# Patient Record
Sex: MALE | Race: WHITE
[De-identification: names, ages, dates, MRNs, and addresses within clinical notes are randomized per-mention and may not be internally consistent; named-entity substitution may affect disease eponyms.]

---

## 2021-07-09 ENCOUNTER — HOSPITAL ENCOUNTER (EMERGENCY)
Dept: HOSPITAL 26 - MED | Age: 71
Discharge: HOME | End: 2021-07-09
Payer: COMMERCIAL

## 2021-07-09 VITALS — DIASTOLIC BLOOD PRESSURE: 64 MMHG | SYSTOLIC BLOOD PRESSURE: 139 MMHG

## 2021-07-09 VITALS — WEIGHT: 150 LBS | HEIGHT: 66 IN | BODY MASS INDEX: 24.11 KG/M2

## 2021-07-09 VITALS — DIASTOLIC BLOOD PRESSURE: 66 MMHG | SYSTOLIC BLOOD PRESSURE: 125 MMHG

## 2021-07-09 DIAGNOSIS — N40.0: ICD-10-CM

## 2021-07-09 DIAGNOSIS — N30.91: Primary | ICD-10-CM

## 2021-07-09 LAB
APPEARANCE UR: CLEAR
BILIRUB UR QL STRIP: (no result)
COLOR UR: YELLOW
GLUCOSE UR STRIP-MCNC: NEGATIVE MG/DL
HGB UR QL STRIP: (no result)
LEUKOCYTE ESTERASE UR QL STRIP: (no result)
NITRITE UR QL STRIP: NEGATIVE
PH UR STRIP: 6 [PH] (ref 5–9)
RBC #/AREA URNS HPF: (no result) /HPF (ref 0–5)
WBC,URINE: (no result) /HPF (ref 0–5)

## 2023-04-08 ENCOUNTER — HOSPITAL ENCOUNTER (EMERGENCY)
Dept: HOSPITAL 26 - MED | Age: 73
Discharge: HOME | End: 2023-04-08
Payer: COMMERCIAL

## 2023-04-08 VITALS — HEIGHT: 66 IN | WEIGHT: 128 LBS | BODY MASS INDEX: 20.57 KG/M2

## 2023-04-08 VITALS — DIASTOLIC BLOOD PRESSURE: 66 MMHG | SYSTOLIC BLOOD PRESSURE: 141 MMHG

## 2023-04-08 DIAGNOSIS — M54.50: ICD-10-CM

## 2023-04-08 DIAGNOSIS — Z79.899: ICD-10-CM

## 2023-04-08 DIAGNOSIS — Z90.49: ICD-10-CM

## 2023-04-08 DIAGNOSIS — N12: Primary | ICD-10-CM

## 2023-04-08 LAB
APPEARANCE UR: (no result)
BILIRUB UR QL STRIP: NEGATIVE
COLOR UR: YELLOW
GLUCOSE UR STRIP-MCNC: NEGATIVE MG/DL
HGB UR QL STRIP: (no result)
LEUKOCYTE ESTERASE UR QL STRIP: (no result)
NITRITE UR QL STRIP: NEGATIVE
PH UR STRIP: 5 [PH] (ref 5–9)
RBC #/AREA URNS HPF: (no result) /HPF (ref 0–5)
WBC,URINE: (no result) /HPF (ref 0–5)

## 2023-04-08 PROCEDURE — 96372 THER/PROPH/DIAG INJ SC/IM: CPT

## 2023-04-08 PROCEDURE — 99283 EMERGENCY DEPT VISIT LOW MDM: CPT

## 2023-04-08 PROCEDURE — 81001 URINALYSIS AUTO W/SCOPE: CPT

## 2023-04-08 NOTE — NUR
73/M WALKED IN C/O PELVIC PAIN, LOW BACK PAIN AND DYSURIA X 1 MONTH. DENIES 
HEMATURIA. AAO4, AFEBRILE, NO ACUTE DISTRESS NOTED.





NKA

PMH: HDL, BPH

## 2023-04-08 NOTE — NUR
Patient discharged with v/s stable. Written and verbal after care instructions 
ABOUT PYELONEPHRITIS AND ABD PAIN given and explained. 

Patient alert, oriented and verbalized understanding of instructions. 
Ambulatory with steady gait. All questions addressed prior to discharge. ID 
band removed. Patient advised to follow up with PMD. Rx of MOTRIN, CIPRO given. 
Patient educated on indication of medication including possible reaction and 
side effects. Opportunity to ask questions provided and answered.

## 2023-04-20 ENCOUNTER — HOSPITAL ENCOUNTER (EMERGENCY)
Dept: HOSPITAL 26 - MED | Age: 73
Discharge: HOME | End: 2023-04-20
Payer: COMMERCIAL

## 2023-04-20 VITALS — SYSTOLIC BLOOD PRESSURE: 119 MMHG | DIASTOLIC BLOOD PRESSURE: 72 MMHG

## 2023-04-20 VITALS — WEIGHT: 137 LBS | BODY MASS INDEX: 22.02 KG/M2 | HEIGHT: 66 IN

## 2023-04-20 VITALS — DIASTOLIC BLOOD PRESSURE: 74 MMHG | SYSTOLIC BLOOD PRESSURE: 122 MMHG

## 2023-04-20 DIAGNOSIS — N31.9: ICD-10-CM

## 2023-04-20 DIAGNOSIS — N13.4: ICD-10-CM

## 2023-04-20 DIAGNOSIS — R10.32: Primary | ICD-10-CM

## 2023-04-20 DIAGNOSIS — Z79.1: ICD-10-CM

## 2023-04-20 DIAGNOSIS — Z79.2: ICD-10-CM

## 2023-04-20 DIAGNOSIS — N40.0: ICD-10-CM

## 2023-04-20 DIAGNOSIS — R30.0: ICD-10-CM

## 2023-04-20 DIAGNOSIS — R35.0: ICD-10-CM

## 2023-04-20 LAB
ALBUMIN FLD-MCNC: 3.9 G/DL (ref 3.4–5)
ANION GAP SERPL CALCULATED.3IONS-SCNC: 13.8 MMOL/L (ref 8–16)
APPEARANCE UR: CLEAR
AST SERPL-CCNC: 30 U/L (ref 15–37)
BASOPHILS # BLD AUTO: 0 K/UL (ref 0–0.22)
BASOPHILS NFR BLD AUTO: 0.2 % (ref 0–2)
BILIRUB SERPL-MCNC: 0.6 MG/DL (ref 0–1)
BILIRUB UR QL STRIP: NEGATIVE
BUN SERPL-MCNC: 13 MG/DL (ref 7–18)
CHLORIDE SERPL-SCNC: 102 MMOL/L (ref 98–107)
CO2 SERPL-SCNC: 26 MMOL/L (ref 21–32)
COLOR UR: YELLOW
CREAT SERPL-MCNC: 1.2 MG/DL (ref 0.6–1.3)
EOSINOPHIL # BLD AUTO: 0.1 K/UL (ref 0–0.4)
EOSINOPHIL NFR BLD AUTO: 1.2 % (ref 0–4)
ERYTHROCYTE [DISTWIDTH] IN BLOOD BY AUTOMATED COUNT: 12.5 % (ref 11.6–13.7)
GFR SERPL CREATININE-BSD FRML MDRD: (no result) ML/MIN (ref 90–?)
GLUCOSE SERPL-MCNC: 94 MG/DL (ref 74–106)
GLUCOSE UR STRIP-MCNC: NEGATIVE MG/DL
HCT VFR BLD AUTO: 42.6 % (ref 36–52)
HGB BLD-MCNC: 14.5 G/DL (ref 12–18)
HGB UR QL STRIP: NEGATIVE
LEUKOCYTE ESTERASE UR QL STRIP: NEGATIVE
LYMPHOCYTES # BLD AUTO: 1.2 K/UL (ref 2–11.5)
LYMPHOCYTES NFR BLD AUTO: 14.6 % (ref 20.5–51.1)
MCH RBC QN AUTO: 31 PG (ref 27–31)
MCHC RBC AUTO-ENTMCNC: 34 G/DL (ref 33–37)
MCV RBC AUTO: 92.4 FL (ref 80–94)
MONOCYTES # BLD AUTO: 0.8 K/UL (ref 0.8–1)
MONOCYTES NFR BLD AUTO: 9.7 % (ref 1.7–9.3)
NEUTROPHILS # BLD AUTO: 6.1 K/UL (ref 1.8–7.7)
NEUTROPHILS NFR BLD AUTO: 74.3 % (ref 42.2–75.2)
NITRITE UR QL STRIP: POSITIVE
PH UR STRIP: 7 [PH] (ref 5–9)
PLATELET # BLD AUTO: 362 K/UL (ref 140–450)
POTASSIUM SERPL-SCNC: 3.8 MMOL/L (ref 3.5–5.1)
RBC # BLD AUTO: 4.62 MIL/UL (ref 4.2–6.1)
RBC #/AREA URNS HPF: (no result) /HPF (ref 0–5)
SODIUM SERPL-SCNC: 138 MMOL/L (ref 136–145)
WBC # BLD AUTO: 8.3 K/UL (ref 4.8–10.8)
WBC,URINE: (no result) /HPF (ref 0–5)

## 2023-04-20 PROCEDURE — 81003 URINALYSIS AUTO W/O SCOPE: CPT

## 2023-04-20 PROCEDURE — 85025 COMPLETE CBC W/AUTO DIFF WBC: CPT

## 2023-04-20 PROCEDURE — 96375 TX/PRO/DX INJ NEW DRUG ADDON: CPT

## 2023-04-20 PROCEDURE — 80053 COMPREHEN METABOLIC PANEL: CPT

## 2023-04-20 PROCEDURE — 81001 URINALYSIS AUTO W/SCOPE: CPT

## 2023-04-20 PROCEDURE — 87086 URINE CULTURE/COLONY COUNT: CPT

## 2023-04-20 PROCEDURE — 99285 EMERGENCY DEPT VISIT HI MDM: CPT

## 2023-04-20 PROCEDURE — 96374 THER/PROPH/DIAG INJ IV PUSH: CPT

## 2023-04-20 PROCEDURE — 74176 CT ABD & PELVIS W/O CONTRAST: CPT

## 2023-04-20 PROCEDURE — 36415 COLL VENOUS BLD VENIPUNCTURE: CPT

## 2023-04-20 PROCEDURE — 96361 HYDRATE IV INFUSION ADD-ON: CPT

## 2023-04-20 NOTE — NUR
sleeping, easily arousable, pain relieved with iv meds. voids per urinal. wears 
diaper due to dribbling

## 2023-05-15 ENCOUNTER — HOSPITAL ENCOUNTER (INPATIENT)
Dept: HOSPITAL 26 - MED | Age: 73
LOS: 3 days | Discharge: NURSING, REHAB OR LONG-TERM CARE | DRG: 374 | End: 2023-05-18
Attending: STUDENT IN AN ORGANIZED HEALTH CARE EDUCATION/TRAINING PROGRAM | Admitting: STUDENT IN AN ORGANIZED HEALTH CARE EDUCATION/TRAINING PROGRAM
Payer: COMMERCIAL

## 2023-05-15 VITALS — SYSTOLIC BLOOD PRESSURE: 141 MMHG | DIASTOLIC BLOOD PRESSURE: 83 MMHG

## 2023-05-15 VITALS — WEIGHT: 127 LBS | BODY MASS INDEX: 21.16 KG/M2 | HEIGHT: 65 IN

## 2023-05-15 DIAGNOSIS — Z79.1: ICD-10-CM

## 2023-05-15 DIAGNOSIS — R18.0: ICD-10-CM

## 2023-05-15 DIAGNOSIS — R59.1: ICD-10-CM

## 2023-05-15 DIAGNOSIS — N31.9: ICD-10-CM

## 2023-05-15 DIAGNOSIS — J44.9: ICD-10-CM

## 2023-05-15 DIAGNOSIS — E83.42: ICD-10-CM

## 2023-05-15 DIAGNOSIS — Z20.822: ICD-10-CM

## 2023-05-15 DIAGNOSIS — E78.00: ICD-10-CM

## 2023-05-15 DIAGNOSIS — C78.6: ICD-10-CM

## 2023-05-15 DIAGNOSIS — N40.0: ICD-10-CM

## 2023-05-15 DIAGNOSIS — F17.200: ICD-10-CM

## 2023-05-15 DIAGNOSIS — C18.9: Primary | ICD-10-CM

## 2023-05-15 DIAGNOSIS — E78.5: ICD-10-CM

## 2023-05-15 DIAGNOSIS — K56.600: ICD-10-CM

## 2023-05-15 DIAGNOSIS — I82.220: ICD-10-CM

## 2023-05-15 DIAGNOSIS — Z79.899: ICD-10-CM

## 2023-05-15 LAB
ALBUMIN FLD-MCNC: 3.4 G/DL (ref 3.4–5)
ANION GAP SERPL CALCULATED.3IONS-SCNC: 11.8 MMOL/L (ref 8–16)
APPEARANCE UR: CLEAR
AST SERPL-CCNC: 19 U/L (ref 15–37)
BASOPHILS # BLD AUTO: 0 K/UL (ref 0–0.22)
BASOPHILS NFR BLD AUTO: 0.2 % (ref 0–2)
BILIRUB SERPL-MCNC: 0.7 MG/DL (ref 0–1)
BILIRUB UR QL STRIP: NEGATIVE
BUN SERPL-MCNC: 12 MG/DL (ref 7–18)
CHLORIDE SERPL-SCNC: 105 MMOL/L (ref 98–107)
CO2 SERPL-SCNC: 25 MMOL/L (ref 21–32)
COLOR UR: YELLOW
CREAT SERPL-MCNC: 1.1 MG/DL (ref 0.6–1.3)
EOSINOPHIL # BLD AUTO: 0.1 K/UL (ref 0–0.4)
EOSINOPHIL NFR BLD AUTO: 0.8 % (ref 0–4)
ERYTHROCYTE [DISTWIDTH] IN BLOOD BY AUTOMATED COUNT: 13 % (ref 11.6–13.7)
GFR SERPL CREATININE-BSD FRML MDRD: (no result) ML/MIN (ref 90–?)
GLUCOSE SERPL-MCNC: 96 MG/DL (ref 74–106)
GLUCOSE UR STRIP-MCNC: NEGATIVE MG/DL
HCT VFR BLD AUTO: 41.6 % (ref 36–52)
HGB BLD-MCNC: 14 G/DL (ref 12–18)
HGB UR QL STRIP: NEGATIVE
LEUKOCYTE ESTERASE UR QL STRIP: NEGATIVE
LIPASE SERPL-CCNC: 61 U/L (ref 73–393)
LYMPHOCYTES # BLD AUTO: 0.8 K/UL (ref 2–11.5)
LYMPHOCYTES NFR BLD AUTO: 10.3 % (ref 20.5–51.1)
MCH RBC QN AUTO: 30 PG (ref 27–31)
MCHC RBC AUTO-ENTMCNC: 34 G/DL (ref 33–37)
MCV RBC AUTO: 90.5 FL (ref 80–94)
MONOCYTES # BLD AUTO: 0.6 K/UL (ref 0.8–1)
MONOCYTES NFR BLD AUTO: 8 % (ref 1.7–9.3)
NEUTROPHILS # BLD AUTO: 6.5 K/UL (ref 1.8–7.7)
NEUTROPHILS NFR BLD AUTO: 80.7 % (ref 42.2–75.2)
NITRITE UR QL STRIP: NEGATIVE
PH UR STRIP: 6 [PH] (ref 5–9)
PLATELET # BLD AUTO: 331 K/UL (ref 140–450)
POTASSIUM SERPL-SCNC: 3.8 MMOL/L (ref 3.5–5.1)
PROTHROMBIN TIME: 11 SECS (ref 10.8–13.4)
RBC # BLD AUTO: 4.59 MIL/UL (ref 4.2–6.1)
SODIUM SERPL-SCNC: 138 MMOL/L (ref 136–145)
WBC # BLD AUTO: 8.1 K/UL (ref 4.8–10.8)

## 2023-05-15 PROCEDURE — C9113 INJ PANTOPRAZOLE SODIUM, VIA: HCPCS

## 2023-05-15 NOTE — NUR
PT ADMITTED TO ICU.  PT AND WIFE AWARE.  ON BEDSIDE CARDIAC MONITOR.   PT IS 
A&OX4 RESP EVEN AND UNLABORED.

## 2023-05-15 NOTE — NUR
73 YR OLD MALE BIB SPOUSE C/O ABD PAIN [id number] RADIATES TO FLANK AREA .  10/10 
PAIN .  PT IS A&OX4 TAGALOG SPEAKING ONLY.  +NAUSEA DYURIA AND GEN WEAKNESS.  
ON BEDSIDE MONITOR .  HOB ELEVATED.  20G L AC BED AT LOWEST LEVEL SIDE RAILS UP 
X2.



NKDA

ENLARGED PROSTATE

## 2023-05-16 VITALS — SYSTOLIC BLOOD PRESSURE: 129 MMHG | DIASTOLIC BLOOD PRESSURE: 61 MMHG

## 2023-05-16 VITALS — DIASTOLIC BLOOD PRESSURE: 78 MMHG | SYSTOLIC BLOOD PRESSURE: 148 MMHG

## 2023-05-16 VITALS — DIASTOLIC BLOOD PRESSURE: 60 MMHG | SYSTOLIC BLOOD PRESSURE: 140 MMHG

## 2023-05-16 VITALS — DIASTOLIC BLOOD PRESSURE: 73 MMHG | SYSTOLIC BLOOD PRESSURE: 145 MMHG

## 2023-05-16 VITALS — DIASTOLIC BLOOD PRESSURE: 77 MMHG | SYSTOLIC BLOOD PRESSURE: 145 MMHG

## 2023-05-16 VITALS — SYSTOLIC BLOOD PRESSURE: 131 MMHG | DIASTOLIC BLOOD PRESSURE: 67 MMHG

## 2023-05-16 VITALS — DIASTOLIC BLOOD PRESSURE: 67 MMHG | SYSTOLIC BLOOD PRESSURE: 124 MMHG

## 2023-05-16 VITALS — SYSTOLIC BLOOD PRESSURE: 136 MMHG | DIASTOLIC BLOOD PRESSURE: 76 MMHG

## 2023-05-16 VITALS — SYSTOLIC BLOOD PRESSURE: 138 MMHG | DIASTOLIC BLOOD PRESSURE: 62 MMHG

## 2023-05-16 VITALS — DIASTOLIC BLOOD PRESSURE: 72 MMHG | SYSTOLIC BLOOD PRESSURE: 150 MMHG

## 2023-05-16 VITALS — DIASTOLIC BLOOD PRESSURE: 80 MMHG | SYSTOLIC BLOOD PRESSURE: 141 MMHG

## 2023-05-16 VITALS — DIASTOLIC BLOOD PRESSURE: 76 MMHG | SYSTOLIC BLOOD PRESSURE: 139 MMHG

## 2023-05-16 VITALS — DIASTOLIC BLOOD PRESSURE: 75 MMHG | SYSTOLIC BLOOD PRESSURE: 145 MMHG

## 2023-05-16 VITALS — DIASTOLIC BLOOD PRESSURE: 71 MMHG | SYSTOLIC BLOOD PRESSURE: 154 MMHG

## 2023-05-16 VITALS — DIASTOLIC BLOOD PRESSURE: 74 MMHG | SYSTOLIC BLOOD PRESSURE: 141 MMHG

## 2023-05-16 VITALS — DIASTOLIC BLOOD PRESSURE: 91 MMHG | SYSTOLIC BLOOD PRESSURE: 147 MMHG

## 2023-05-16 VITALS — SYSTOLIC BLOOD PRESSURE: 148 MMHG | DIASTOLIC BLOOD PRESSURE: 70 MMHG

## 2023-05-16 LAB
AMYLASE SERPL-CCNC: 53 U/L (ref 25–115)
ANION GAP SERPL CALCULATED.3IONS-SCNC: 12.5 MMOL/L (ref 8–16)
BARBITURATES UR QL SCN: NEGATIVE NG/ML
BASOPHILS # BLD AUTO: 0 K/UL (ref 0–0.22)
BASOPHILS NFR BLD AUTO: 0.1 % (ref 0–2)
BENZODIAZ UR QL SCN: NEGATIVE NG/ML
BUN SERPL-MCNC: 13 MG/DL (ref 7–18)
BZE UR QL SCN: NEGATIVE NG/ML
CANNABINOIDS UR QL SCN: NEGATIVE NG/ML
CHLORIDE SERPL-SCNC: 104 MMOL/L (ref 98–107)
CHOLEST/HDLC SERPL: 3.7 {RATIO} (ref 1–4.5)
CO2 SERPL-SCNC: 26.2 MMOL/L (ref 21–32)
CREAT SERPL-MCNC: 0.9 MG/DL (ref 0.6–1.3)
EOSINOPHIL # BLD AUTO: 0.1 K/UL (ref 0–0.4)
EOSINOPHIL NFR BLD AUTO: 1.3 % (ref 0–4)
ERYTHROCYTE [DISTWIDTH] IN BLOOD BY AUTOMATED COUNT: 13 % (ref 11.6–13.7)
GFR SERPL CREATININE-BSD FRML MDRD: (no result) ML/MIN (ref 90–?)
GLUCOSE SERPL-MCNC: 88 MG/DL (ref 74–106)
HCT VFR BLD AUTO: 38.7 % (ref 36–52)
HDLC SERPL-MCNC: 41 MG/DL (ref 40–60)
HGB BLD-MCNC: 13.2 G/DL (ref 12–18)
LDLC SERPL CALC-MCNC: 92 MG/DL (ref 60–100)
LIPASE SERPL-CCNC: 50 U/L (ref 73–393)
LYMPHOCYTES # BLD AUTO: 0.8 K/UL (ref 2–11.5)
LYMPHOCYTES NFR BLD AUTO: 11.3 % (ref 20.5–51.1)
MCH RBC QN AUTO: 31 PG (ref 27–31)
MCHC RBC AUTO-ENTMCNC: 34 G/DL (ref 33–37)
MCV RBC AUTO: 89.9 FL (ref 80–94)
MONOCYTES # BLD AUTO: 0.5 K/UL (ref 0.8–1)
MONOCYTES NFR BLD AUTO: 7.4 % (ref 1.7–9.3)
NEUTROPHILS # BLD AUTO: 5.9 K/UL (ref 1.8–7.7)
NEUTROPHILS NFR BLD AUTO: 79.9 % (ref 42.2–75.2)
OPIATES UR QL SCN: POSITIVE NG/ML
PCP UR QL SCN: NEGATIVE NG/ML
PLATELET # BLD AUTO: 325 K/UL (ref 140–450)
POTASSIUM SERPL-SCNC: 3.7 MMOL/L (ref 3.5–5.1)
PROTHROMBIN TIME: 11.8 SECS (ref 10.8–13.4)
PROTHROMBIN TIME: 12.1 SECS (ref 10.8–13.4)
RBC # BLD AUTO: 4.3 MIL/UL (ref 4.2–6.1)
SODIUM SERPL-SCNC: 139 MMOL/L (ref 136–145)
T4 FREE SERPL-MCNC: 1.27 NG/DL (ref 0.76–1.46)
TRIGL SERPL-MCNC: 96 MG/DL (ref 30–150)
TSH SERPL DL<=0.05 MIU/L-ACNC: 2.13 UIU/ML (ref 0.34–3.74)
WBC # BLD AUTO: 7.4 K/UL (ref 4.8–10.8)

## 2023-05-16 RX ADMIN — HEPARIN SODIUM SCH MLS/HR: 5000 INJECTION, SOLUTION INTRAVENOUS at 23:19

## 2023-05-16 RX ADMIN — MORPHINE SULFATE PRN MG: 2 INJECTION, SOLUTION INTRAMUSCULAR; INTRAVENOUS at 11:25

## 2023-05-16 RX ADMIN — HYDROCODONE BITARTRATE AND ACETAMINOPHEN PRN TAB: 7.5; 325 TABLET ORAL at 17:21

## 2023-05-16 RX ADMIN — HEPARIN SODIUM SCH MLS/HR: 5000 INJECTION, SOLUTION INTRAVENOUS at 01:04

## 2023-05-16 RX ADMIN — DOCUSATE SODIUM SCH MG: 100 CAPSULE, LIQUID FILLED ORAL at 20:06

## 2023-05-16 RX ADMIN — HEPARIN SODIUM SCH MLS/HR: 5000 INJECTION, SOLUTION INTRAVENOUS at 07:00

## 2023-05-16 RX ADMIN — SODIUM CHLORIDE SCH MLS/HR: 9 INJECTION, SOLUTION INTRAVENOUS at 12:32

## 2023-05-16 NOTE — NUR
RECEIVED PT IN BED ASLEEP EASILY AROUSABLE BY VERBAL STIMULI. DENIES PAIN AT THIS TIME. NO 
ACUTE RESPIRATORY DISTRESS NOTED. SKIN WARM AND DRY TO TOUCH. HEPARIN DRIP INFUSING WELL AS 
ORDERED. SAFETY PRECAUTIONS IN PLACE, CALL LIGHT IN REACH.

## 2023-05-16 NOTE — NUR
DC PLANNING

A 73 Y.O. [place] PATIENT PRESENTED TO ED FOR EVALUATION OF 1 MONTH HX OF 
INTERMITTENT,SQUEEZING/CRAMPING ABDOMINAL PAIN ASSOCIATED WITH EPIGASTRIC/ABDOMINAL 
PAIN,DECREASED APPETITE,DYSURIA AND GENERALIZED WEAKNESS.PATIENT ALSO COMPLAINING OF 
DIFFICULTY WALKING SECONDARY TO ABDOMINAL PAIN AND DECREASED URINARY OUTPUT. HX OF 
BPI,COPD,ALERT AND ORIENTED.ON ROOM AIR.ON HEPARIN DRIP.CM TO FOLLOW. 

-------------------------------------------------------------------------------

Addendum: 05/16/23 at 1523 by BALA HIGUERA CM

-------------------------------------------------------------------------------

DC PLANNING-LATE ENTRY

ABDOMEN PELVIS CT CONCERNING FOR LANIE METASTATIC DISEASE VS HYPOPROLIFERATIVE  DISEASE, 
MILD ASCITES CONCERNING FOR MALIGNANT ASCITES.PATIENT HAS NO PCP.DC PLAN - BACK  HOME WITH 
WIFE POSSIBLY WITH HOME HEALTH FOR PT.CM TO FOLLOW.   


-------------------------------------------------------------------------------

Addendum: 05/17/23 at 1240 by BALA HIGUERA CM

-------------------------------------------------------------------------------

DC PLANNING 

TRANSFERRED TO TELEMETRY UNIT 5/16/23.HEPARIN DRIP DISCONTINUED AND WAS STARTED ON ELIQUIS. 
HEMATOLOGY-ONCOLOGY ON BOARD FOR POSSIBLE STAGE 1V COLON CA.GI CONSULT REQUESTED FOR 
POSSIBLE COLONOSCOPY.NO ADDITIONAL CARDIAC WORKUP PER CARDIO.DC PLAN -TO [facility] FOR SNF 
PLACEMENT WHEN STABLE FOR DISCHARGE.WIFE AGREED WITH DC PLAN.CM TO FOLLOW.

-------------------------------------------------------------------------------

Addendum: 05/18/23 at 1638 by REX ORO CM

-------------------------------------------------------------------------------

RECEIVED ORDER FOR PATIENT TO GO TO SNF FOR PT. FAXED ALL PAPERWORK TO [facility]. SPOKE WITH KESHAV 
PATIENT WAS ACCEPTED AND WILL BE GOING TO ROOM 33A UNDER DR HOLLAND. TRANSPORTATION ARRANGED 
WITH THE HOUSE SUP WITH A [facility] TO TRANSPORT PATIENT TO [facility] WITH IN THE HOUR. NURSE TANYA AND 
WIFE AWARE OF THE ABOVE INFORMATION.

## 2023-05-16 NOTE — NUR
PTT RESULT @98.2. PER HEPARIN PROTOCOL, HOLD FOR ONE HOUR AND TITRATE DOWN 3 UNITS/KG/HR. 
WILL RESUME HEPARIN DRIP AT 0950 @15UNITS/KG/HR. PT VSS. NAD NOTED. WILL CONT TO MONITOR.

## 2023-05-16 NOTE — NUR
RECEIVED PT FROM ER. REPORT GIVEN BY AGUSTINA BRODERICK. PT ALERT AND ORIENTED. ABLE TO FOLLOW 
SIMPLE COMMANDS AND ABLE TO PROVIDE INFORMATION REGARDING HIS HEALTH CONDITION AND HISTORY. 
ON ROOM AIR WITH SATURATION OF 96%. LUNG SOUNDS CLEAR TO AUSCULTATION AT ALL LOBES. NO SOB. 
EYES REACTIVE TO LIGHT AND ACCOMMODATION. NORMAL SINUS RHYTHM ON THE MONITOR. SKIN INTACT. 
CONTINENT WITH CLEAR YELLOW URINE COLOR. GENERALIZED WEAKNESS ON UPPER AND LOWER EXTREMITIES 
BILATERALLY. REPOSITION AND PLACED COMFORTABLY IN BED WITH HEAD OF BED ELEVATED AT 30 
DEGREE. BED LOCK AND PLACED AT LOWEST POSITION. CALL LIGHT AND BELONGINGS WITHIN REACH. MAKE 
ALL NEEDS KNOWN. NO S/S OF DISTRESS. NO S/S OF PAIN. WIFE IS AT BEDSIDE DURING TRANSFER TO 
ICU. WILL CONTINUE TO MONITOR FOR CHANGE OF CONDITION

## 2023-05-16 NOTE — NUR
Patient will be admitted to care of DR ZARCO. Admited to ICU.  Will go to room 
ICU 8. Belongings list completed.  Report to AR RIBEIRO.

## 2023-05-16 NOTE — NUR
RECEIVED FROM ICU VIA WHEELCHAIR. ACCOMPANIED BY PT -WIFE. A & O X4. NO SOB, NOTED. NO C/O 
PAIN. IN STABLE CONDITION. SKIN WARM, DRY, AND INTACT. KEEP COMFORTABLE ON BED. FALL 
PRECAUTION APPLIED. CALL LIGHT WITHIN REACH.

## 2023-05-16 NOTE — NUR
CARE ENDORSED TO ERIC RIBEIRO FOR TELEMETRY UNIT ROOM 105B. VSS. NAD NOTED. HEPARIN DRIP RUNNING 
AT 800UNITS/HR. NO SIGNS OF BLEEDING. AAOX4. PT CONTINENT, USES URINAL. NO COMPLAINTS OF 
PAIN AT THIS TIME. ALL QUESTIONS ANSWERED. WIFE AT BEDSIDE. END OF CARE.

## 2023-05-16 NOTE — NUR
DC PLANNING 



*** ASSESSMENT COMPLETE***



PLEASE REFER TO ASSESSMENT FOR ADDITIONAL DETAILS



PT REPORTS BEING TOLD ABOUT POSSIBLE CANCER DX AND REPORTS BEING WORRIED. PT STRUGGLED TO 
IDENTIFY DC PLAN, HOWEVER CURRENT DC PLAN TENTATIVE ON PHYSICIAN RECOMMENDATIONS. 

-------------------------------------------------------------------------------

Addendum: 05/16/23 at 1223 by Staci Marsh SS

-------------------------------------------------------------------------------

Amended: Links added.

## 2023-05-16 NOTE — NUR
PATIENT HAS BEEN SCREENED AND CATEGORIZED AS MODERATE NUTRITION RISK. PATIENT WILL BE SEEN 
WITHIN 3-5 DAYS OF ADMISSION.



5/18/235/20/23



REVIEWED BY CASSIDY HARRISON RD

## 2023-05-17 VITALS — SYSTOLIC BLOOD PRESSURE: 147 MMHG | DIASTOLIC BLOOD PRESSURE: 61 MMHG

## 2023-05-17 VITALS — DIASTOLIC BLOOD PRESSURE: 44 MMHG | SYSTOLIC BLOOD PRESSURE: 110 MMHG

## 2023-05-17 VITALS — SYSTOLIC BLOOD PRESSURE: 140 MMHG | DIASTOLIC BLOOD PRESSURE: 62 MMHG

## 2023-05-17 VITALS — DIASTOLIC BLOOD PRESSURE: 76 MMHG | SYSTOLIC BLOOD PRESSURE: 142 MMHG

## 2023-05-17 VITALS — SYSTOLIC BLOOD PRESSURE: 137 MMHG | DIASTOLIC BLOOD PRESSURE: 57 MMHG

## 2023-05-17 VITALS — DIASTOLIC BLOOD PRESSURE: 69 MMHG | SYSTOLIC BLOOD PRESSURE: 148 MMHG

## 2023-05-17 LAB
ANION GAP SERPL CALCULATED.3IONS-SCNC: 12.7 MMOL/L (ref 8–16)
BASOPHILS # BLD AUTO: 0 K/UL (ref 0–0.22)
BASOPHILS NFR BLD AUTO: 0.2 % (ref 0–2)
BUN SERPL-MCNC: 11 MG/DL (ref 7–18)
CHLORIDE SERPL-SCNC: 104 MMOL/L (ref 98–107)
CO2 SERPL-SCNC: 26.1 MMOL/L (ref 21–32)
CREAT SERPL-MCNC: 1 MG/DL (ref 0.6–1.3)
EOSINOPHIL # BLD AUTO: 0.1 K/UL (ref 0–0.4)
EOSINOPHIL NFR BLD AUTO: 1.7 % (ref 0–4)
ERYTHROCYTE [DISTWIDTH] IN BLOOD BY AUTOMATED COUNT: 12.9 % (ref 11.6–13.7)
GFR SERPL CREATININE-BSD FRML MDRD: (no result) ML/MIN (ref 90–?)
GLUCOSE SERPL-MCNC: 85 MG/DL (ref 74–106)
HCT VFR BLD AUTO: 37.7 % (ref 36–52)
HGB BLD-MCNC: 13 G/DL (ref 12–18)
LYMPHOCYTES # BLD AUTO: 0.9 K/UL (ref 2–11.5)
LYMPHOCYTES NFR BLD AUTO: 13.6 % (ref 20.5–51.1)
MAGNESIUM SERPL-MCNC: 1.9 MG/DL (ref 1.8–2.4)
MCH RBC QN AUTO: 31 PG (ref 27–31)
MCHC RBC AUTO-ENTMCNC: 34 G/DL (ref 33–37)
MCV RBC AUTO: 89.7 FL (ref 80–94)
MONOCYTES # BLD AUTO: 0.7 K/UL (ref 0.8–1)
MONOCYTES NFR BLD AUTO: 10.4 % (ref 1.7–9.3)
NEUTROPHILS # BLD AUTO: 4.9 K/UL (ref 1.8–7.7)
NEUTROPHILS NFR BLD AUTO: 74.1 % (ref 42.2–75.2)
PHOSPHATE SERPL-MCNC: 2.9 MG/DL (ref 2.5–4.9)
PLATELET # BLD AUTO: 325 K/UL (ref 140–450)
POTASSIUM SERPL-SCNC: 3.8 MMOL/L (ref 3.5–5.1)
RBC # BLD AUTO: 4.21 MIL/UL (ref 4.2–6.1)
SODIUM SERPL-SCNC: 139 MMOL/L (ref 136–145)
WBC # BLD AUTO: 6.6 K/UL (ref 4.8–10.8)

## 2023-05-17 RX ADMIN — HYDROCODONE BITARTRATE AND ACETAMINOPHEN PRN TAB: 7.5; 325 TABLET ORAL at 06:11

## 2023-05-17 RX ADMIN — SODIUM CHLORIDE SCH MLS/HR: 9 INJECTION, SOLUTION INTRAVENOUS at 09:07

## 2023-05-17 RX ADMIN — DOCUSATE SODIUM SCH MG: 100 CAPSULE, LIQUID FILLED ORAL at 09:07

## 2023-05-17 RX ADMIN — DOCUSATE SODIUM SCH MG: 100 CAPSULE, LIQUID FILLED ORAL at 20:10

## 2023-05-17 RX ADMIN — APIXABAN SCH MG: 2.5 TABLET, FILM COATED ORAL at 20:11

## 2023-05-17 RX ADMIN — MORPHINE SULFATE PRN MG: 2 INJECTION, SOLUTION INTRAMUSCULAR; INTRAVENOUS at 02:47

## 2023-05-17 RX ADMIN — HEPARIN SODIUM SCH MLS/HR: 5000 INJECTION, SOLUTION INTRAVENOUS at 02:51

## 2023-05-17 RX ADMIN — POLYETHYLENE GLYCOL 3350 SCH GM: 17 POWDER, FOR SOLUTION ORAL at 20:10

## 2023-05-17 RX ADMIN — PANTOPRAZOLE SODIUM SCH MG: 40 INJECTION, POWDER, FOR SOLUTION INTRAVENOUS at 09:07

## 2023-05-17 RX ADMIN — LACTULOSE SCH GM: 10 SOLUTION ORAL at 20:08

## 2023-05-17 NOTE — NUR
RECEIVED PATIENT FROM PM SHIFT NURSE FOR CONTINUATION OF CARE. JAI SEEN ASLEEP. NORAML 
RISE AND FALL OF CHEST

## 2023-05-17 NOTE — NUR
PATIENT SEEN BY MD WITH RN. PATIENT STATES HE WANTS TO HAVE MORE TESTS DONE TO PINPOINT THE 
SPECIFIC MEDICAL PROBLEM IN ORDER FOR PROPER TREATMENT AND HEALING. PATIENT DENIES COMFORT 
CARE IN FACILITY DUE TO HIM AND HIS WIFE BEING OF OLD AGE WITH NO AVAILABLE ASSISTANCE. 
PATIENT ALSO REFUSES TO GO TO A CARE FACILITY. PATIENT WANTS TO BE TREATED IN THE HOSPITAL.

## 2023-05-17 NOTE — NUR
PATIENT DECIDES NOT TO TAKE MORPHINE AND WANTS SOMETHING ORAL. MD NOTIFIED. MD GAVE ORDERS 
FOR DILAUDED PO FOR SEVERE PAIN.

## 2023-05-17 NOTE — NUR
VITAL SIGNS TAKEN AND DOCUMENTED, WITHIN NORMAL LIMITS. DENIES PAIN. CALL LIGHT REMAINS 
WITHIN REACH.

## 2023-05-17 NOTE — NUR
PT IS ASLEEP. ALL NEEDS ATTENDED TO. NO S/SX OF PAIN NOR DISCOMFORT. SAFETY PRECAUTIONS IN 
PLACE, CALL LIGHT REMAINS WITHIN REACH.

## 2023-05-17 NOTE — NUR
RECEIVED PT IN BED AWAKE, ALERT AND ORIENTED X 4. DENIES PAIN AT THIS TIME. NO  ACUTE 
RESPIRATORY DISTRESS NOTED. SKIN WARM AND DRY TO TOUCH. IVF INFUSING WELL AS ORDERED. SAFETY 
PRECAUTIONS IN PLACE, CALL LIGHT IN REACH ENCOURAGED TO CALL IF ASSISTANCE IS NEEDED, PT 
VERBALLY ACKNOWLEDGED.

## 2023-05-18 VITALS — DIASTOLIC BLOOD PRESSURE: 67 MMHG | SYSTOLIC BLOOD PRESSURE: 154 MMHG

## 2023-05-18 VITALS — DIASTOLIC BLOOD PRESSURE: 68 MMHG | SYSTOLIC BLOOD PRESSURE: 107 MMHG

## 2023-05-18 VITALS — SYSTOLIC BLOOD PRESSURE: 107 MMHG | DIASTOLIC BLOOD PRESSURE: 68 MMHG

## 2023-05-18 VITALS — SYSTOLIC BLOOD PRESSURE: 140 MMHG | DIASTOLIC BLOOD PRESSURE: 68 MMHG

## 2023-05-18 VITALS — SYSTOLIC BLOOD PRESSURE: 142 MMHG | DIASTOLIC BLOOD PRESSURE: 68 MMHG

## 2023-05-18 LAB
ANION GAP SERPL CALCULATED.3IONS-SCNC: 9.6 MMOL/L (ref 8–16)
BASOPHILS # BLD AUTO: 0 K/UL (ref 0–0.22)
BASOPHILS NFR BLD AUTO: 0.1 % (ref 0–2)
BUN SERPL-MCNC: 9 MG/DL (ref 7–18)
CHLORIDE SERPL-SCNC: 104 MMOL/L (ref 98–107)
CO2 SERPL-SCNC: 28 MMOL/L (ref 21–32)
CREAT SERPL-MCNC: 0.9 MG/DL (ref 0.6–1.3)
EOSINOPHIL # BLD AUTO: 0.1 K/UL (ref 0–0.4)
EOSINOPHIL NFR BLD AUTO: 2 % (ref 0–4)
ERYTHROCYTE [DISTWIDTH] IN BLOOD BY AUTOMATED COUNT: 12.7 % (ref 11.6–13.7)
GFR SERPL CREATININE-BSD FRML MDRD: (no result) ML/MIN (ref 90–?)
GLUCOSE SERPL-MCNC: 97 MG/DL (ref 74–106)
HCT VFR BLD AUTO: 35.3 % (ref 36–52)
HGB BLD-MCNC: 12.1 G/DL (ref 12–18)
LYMPHOCYTES # BLD AUTO: 0.8 K/UL (ref 2–11.5)
LYMPHOCYTES NFR BLD AUTO: 11.6 % (ref 20.5–51.1)
MAGNESIUM SERPL-MCNC: 1.7 MG/DL (ref 1.8–2.4)
MCH RBC QN AUTO: 31 PG (ref 27–31)
MCHC RBC AUTO-ENTMCNC: 34 G/DL (ref 33–37)
MCV RBC AUTO: 89.7 FL (ref 80–94)
MONOCYTES # BLD AUTO: 0.8 K/UL (ref 0.8–1)
MONOCYTES NFR BLD AUTO: 11.2 % (ref 1.7–9.3)
NEUTROPHILS # BLD AUTO: 5.3 K/UL (ref 1.8–7.7)
NEUTROPHILS NFR BLD AUTO: 75.1 % (ref 42.2–75.2)
PHOSPHATE SERPL-MCNC: 2.7 MG/DL (ref 2.5–4.9)
PLATELET # BLD AUTO: 288 K/UL (ref 140–450)
POTASSIUM SERPL-SCNC: 3.6 MMOL/L (ref 3.5–5.1)
RBC # BLD AUTO: 3.94 MIL/UL (ref 4.2–6.1)
SODIUM SERPL-SCNC: 138 MMOL/L (ref 136–145)
WBC # BLD AUTO: 7.1 K/UL (ref 4.8–10.8)

## 2023-05-18 PROCEDURE — 0DJD8ZZ INSPECTION OF LOWER INTESTINAL TRACT, VIA NATURAL OR ARTIFICIAL OPENING ENDOSCOPIC: ICD-10-PCS

## 2023-05-18 RX ADMIN — PANTOPRAZOLE SODIUM SCH MG: 40 INJECTION, POWDER, FOR SOLUTION INTRAVENOUS at 08:36

## 2023-05-18 RX ADMIN — LACTULOSE SCH GM: 10 SOLUTION ORAL at 08:36

## 2023-05-18 RX ADMIN — SODIUM CHLORIDE SCH MLS/HR: 9 INJECTION, SOLUTION INTRAVENOUS at 03:38

## 2023-05-18 RX ADMIN — POLYETHYLENE GLYCOL 3350 SCH GM: 17 POWDER, FOR SOLUTION ORAL at 08:37

## 2023-05-18 RX ADMIN — SODIUM SULFATE, POTASSIUM SULFATE, MAGNESIUM SULFATE SCH ML: 17.5; 3.13; 1.6 SOLUTION, CONCENTRATE ORAL at 09:00

## 2023-05-18 RX ADMIN — POLYETHYLENE GLYCOL 3350 SCH GM: 17 POWDER, FOR SOLUTION ORAL at 13:00

## 2023-05-18 RX ADMIN — SODIUM SULFATE, POTASSIUM SULFATE, MAGNESIUM SULFATE SCH ML: 17.5; 3.13; 1.6 SOLUTION, CONCENTRATE ORAL at 05:37

## 2023-05-18 RX ADMIN — APIXABAN SCH MG: 2.5 TABLET, FILM COATED ORAL at 09:00

## 2023-05-18 RX ADMIN — DOCUSATE SODIUM SCH MG: 100 CAPSULE, LIQUID FILLED ORAL at 08:37

## 2023-05-18 RX ADMIN — LACTULOSE SCH GM: 10 SOLUTION ORAL at 13:00

## 2023-05-18 RX ADMIN — SENNOSIDES A AND B SCH MG: 8.6 TABLET, FILM COATED ORAL at 13:00

## 2023-05-18 RX ADMIN — SENNOSIDES A AND B SCH MG: 8.6 TABLET, FILM COATED ORAL at 08:37

## 2023-05-18 NOTE — NUR
patient discharged to [facility] via [facility] accompanied by wife. report given to Heena RIBEIRO at [facility]. 
patient stable upon discharge

## 2023-05-18 NOTE — NUR
received patient resting in bed, not in any form of distress, patient on bowel prep for 
colonoscopy, clear watery stools noted, Dr. Rob came to see patient. will continue to 
monitor.

## 2023-05-18 NOTE — NUR
CALL PLACED TO DR. LEAHY TO INFORM MD THAT PT HAD ONLY 1 BM AND NOT CLEAR, HAD A LOT OF 
FORMED STOOLS, PER MD JUST KEEP GIVING ALL LAXATIVES.

## 2023-05-18 NOTE — NUR
5/18/23 RD INITIAL ASSESSMENT COMPLETED



PLEASE REFER TO NUTRITION ASSESSMENT UNDER CARE ACTIVITY FOR ESTIMATED NUTRITIONAL NEEDS. 



1. MONITOR NPO STATUS 

2. RECOMMEND CARDIAC DIET WHEN/IF MEDICALLY APPROPRIATE

3. RD TO FOLLOW-UP 7 DAYS, LOW RISK 



REVIEWED BY CASSIDY HARRISON RD

## 2023-05-18 NOTE — NUR
ASSISTED PT TO THE BATHROOM AND BACK IN BED. PATIENT HAD A LARGE WATERY WITH FORMED STOOL. 
UPON CLEANING BY PT, NOTED SOME BLOOD STAIN ON THE TISSUE, PER PT HE HAS HEMORRHOIDS. MADE 
COMFORTABLE IN BED, PLACE CALL LIGHT WITHIN REACH, INSTRUCTED TO CALL IF ASSISTANCE IS 
NEEDED, PT VERBALLY ACKNOWLEDGED.

## 2023-05-18 NOTE — NUR
PATIENT HAD A LARGE WATERY BROWN WITH SOME SOLID STOOL. SUPREP GIVEN AS ORDERED. AM CARE 
DONE BY PT.

## 2023-05-22 ENCOUNTER — HOSPITAL ENCOUNTER (INPATIENT)
Dept: HOSPITAL 26 - MED | Age: 73
LOS: 3 days | Discharge: NURSING, REHAB OR LONG-TERM CARE | DRG: 374 | End: 2023-05-25
Attending: INTERNAL MEDICINE | Admitting: INTERNAL MEDICINE
Payer: COMMERCIAL

## 2023-05-22 VITALS — BODY MASS INDEX: 17.52 KG/M2 | WEIGHT: 109 LBS | HEIGHT: 66 IN

## 2023-05-22 VITALS — SYSTOLIC BLOOD PRESSURE: 137 MMHG | DIASTOLIC BLOOD PRESSURE: 69 MMHG

## 2023-05-22 DIAGNOSIS — K63.9: ICD-10-CM

## 2023-05-22 DIAGNOSIS — C78.6: ICD-10-CM

## 2023-05-22 DIAGNOSIS — I10: ICD-10-CM

## 2023-05-22 DIAGNOSIS — K56.609: ICD-10-CM

## 2023-05-22 DIAGNOSIS — C18.9: Primary | ICD-10-CM

## 2023-05-22 DIAGNOSIS — E78.5: ICD-10-CM

## 2023-05-22 DIAGNOSIS — J44.9: ICD-10-CM

## 2023-05-22 DIAGNOSIS — Z79.01: ICD-10-CM

## 2023-05-22 DIAGNOSIS — K62.3: ICD-10-CM

## 2023-05-22 DIAGNOSIS — N40.0: ICD-10-CM

## 2023-05-22 DIAGNOSIS — C78.5: ICD-10-CM

## 2023-05-22 DIAGNOSIS — Z20.822: ICD-10-CM

## 2023-05-22 DIAGNOSIS — Z79.899: ICD-10-CM

## 2023-05-22 DIAGNOSIS — E43: ICD-10-CM

## 2023-05-22 DIAGNOSIS — E87.6: ICD-10-CM

## 2023-05-22 PROCEDURE — C9113 INJ PANTOPRAZOLE SODIUM, VIA: HCPCS

## 2023-05-22 NOTE — NUR
Patient resting in bed, A/Ox4, chest rise and fall symmetrical, no s/s of 
distress, patinet on monitor.

## 2023-05-23 VITALS — DIASTOLIC BLOOD PRESSURE: 64 MMHG | SYSTOLIC BLOOD PRESSURE: 129 MMHG

## 2023-05-23 VITALS — SYSTOLIC BLOOD PRESSURE: 107 MMHG | DIASTOLIC BLOOD PRESSURE: 71 MMHG

## 2023-05-23 VITALS — DIASTOLIC BLOOD PRESSURE: 63 MMHG | SYSTOLIC BLOOD PRESSURE: 139 MMHG

## 2023-05-23 LAB
ALBUMIN FLD-MCNC: 3.1 G/DL (ref 3.4–5)
ANION GAP SERPL CALCULATED.3IONS-SCNC: 12 MMOL/L (ref 8–16)
APPEARANCE UR: (no result)
AST SERPL-CCNC: 26 U/L (ref 15–37)
BASOPHILS # BLD AUTO: 0.1 K/UL (ref 0–0.22)
BASOPHILS NFR BLD AUTO: 0.6 % (ref 0–2)
BILIRUB SERPL-MCNC: 0.6 MG/DL (ref 0–1)
BILIRUB UR QL STRIP: NEGATIVE
BUN SERPL-MCNC: 14 MG/DL (ref 7–18)
CHLORIDE SERPL-SCNC: 103 MMOL/L (ref 98–107)
CO2 SERPL-SCNC: 27.1 MMOL/L (ref 21–32)
COLOR UR: YELLOW
CREAT SERPL-MCNC: 1 MG/DL (ref 0.6–1.3)
EOSINOPHIL # BLD AUTO: 0.2 K/UL (ref 0–0.4)
EOSINOPHIL NFR BLD AUTO: 1.9 % (ref 0–4)
ERYTHROCYTE [DISTWIDTH] IN BLOOD BY AUTOMATED COUNT: 13.4 % (ref 11.6–13.7)
GFR SERPL CREATININE-BSD FRML MDRD: (no result) ML/MIN (ref 90–?)
GLUCOSE SERPL-MCNC: 105 MG/DL (ref 74–106)
GLUCOSE UR STRIP-MCNC: NEGATIVE MG/DL
HCT VFR BLD AUTO: 35.7 % (ref 36–52)
HGB BLD-MCNC: 11.8 G/DL (ref 12–18)
HGB UR QL STRIP: NEGATIVE
LEUKOCYTE ESTERASE UR QL STRIP: NEGATIVE
LYMPHOCYTES # BLD AUTO: 0.9 K/UL (ref 2–11.5)
LYMPHOCYTES NFR BLD AUTO: 11.6 % (ref 20.5–51.1)
MCH RBC QN AUTO: 30 PG (ref 27–31)
MCHC RBC AUTO-ENTMCNC: 33 G/DL (ref 33–37)
MCV RBC AUTO: 90 FL (ref 80–94)
MONOCYTES # BLD AUTO: 0.9 K/UL (ref 0.8–1)
MONOCYTES NFR BLD AUTO: 10.8 % (ref 1.7–9.3)
NEUTROPHILS # BLD AUTO: 6.1 K/UL (ref 1.8–7.7)
NEUTROPHILS NFR BLD AUTO: 75.1 % (ref 42.2–75.2)
NITRITE UR QL STRIP: NEGATIVE
PH UR STRIP: 6.5 [PH] (ref 5–9)
PLATELET # BLD AUTO: 282 K/UL (ref 140–450)
POTASSIUM SERPL-SCNC: 3.1 MMOL/L (ref 3.5–5.1)
PROTHROMBIN TIME: 13 SECS (ref 10.8–13.4)
RBC # BLD AUTO: 3.96 MIL/UL (ref 4.2–6.1)
SODIUM SERPL-SCNC: 139 MMOL/L (ref 136–145)
WBC # BLD AUTO: 8.1 K/UL (ref 4.8–10.8)

## 2023-05-23 RX ADMIN — SODIUM CHLORIDE, SODIUM LACTATE, POTASSIUM CHLORIDE, AND CALCIUM CHLORIDE SCH MLS/HR: .6; .31; .03; .02 INJECTION, SOLUTION INTRAVENOUS at 06:30

## 2023-05-23 RX ADMIN — SODIUM CHLORIDE, SODIUM LACTATE, POTASSIUM CHLORIDE, AND CALCIUM CHLORIDE SCH MLS/HR: .6; .31; .03; .02 INJECTION, SOLUTION INTRAVENOUS at 15:40

## 2023-05-23 RX ADMIN — PANTOPRAZOLE SODIUM SCH MG: 40 INJECTION, POWDER, FOR SOLUTION INTRAVENOUS at 20:51

## 2023-05-23 RX ADMIN — PANTOPRAZOLE SODIUM SCH MG: 40 INJECTION, POWDER, FOR SOLUTION INTRAVENOUS at 09:39

## 2023-05-23 NOTE — NUR
Patient resting in bed, A/Ox4, chest rise and fall symmetrical, no s/s of 
distress, patinet on monitor.

-------------------------------------------------------------------------------

Addendum: 05/23/23 at 0636 by [id number]

-------------------------------------------------------------------------------

Patient resting in bed, A/Ox4, chest rise and fall symmetrical, no c/o pain or 
s/s of distress, patinet on monitor.

## 2023-05-23 NOTE — NUR
PATIENT HAS BEEN SCREENED AND CATEGORIZED AS HIGH NUTRITION RISK. PATIENT WILL BE SEEN 
WITHIN 1-2 DAYS OF ADMISSION.



5/24/23-5/25/23



REVIEWED BY CASSIDY HARRISON RD

## 2023-05-23 NOTE — NUR
Patient will be admitted to care of MD BOSS. Admited to MED SURG.  Will go to 
[id number]. Belongings list completed.  Report to VIKI RN AT BEDSIDE.

## 2023-05-23 NOTE — NUR
DC PLANNING

A 73Y.O. MALE Malian PATIENT ADMITTED [place] TELEMETRY  [place] [facility] [place]-ADMITT[place] FOR 
COMPLAINTS OF VOMITING AND ABDOMINAL PATIENT WAS ADMITTED HERE 5/19 FOR SAME PROBLEM.WITH HX 
OF UNCONFIRMED COLON CANCER ,BPH,HTN,HLD,COPD PRESENTING WITH BLOODY STOOLS X 3 DAYS .PRIOR 
EVALUATION DURING IST ADMISSION WAS SUSPICIOUS OF CECAL MASS WITH ADVANCED LOCAL METS TO THE 
PERITONEUM.SCHEDULED FOR COLONOSCOPY TODAY AND BEING PREPPED WITH GOLYTELY.ON PROTONIX IVPB 
BID AND ZOFRAN PRN.HGB/HCT STABLE.CT PELVIS/ABDOMEN PROBABLE SBO.DC PLAN- [place] [facility] [place] 
PATIENT CONDITION IMPROVES AND WHEN PATIENT RESPONDS TO TX.CM   TO FOLLOW.

-------------------------------------------------------------------------------

Addendum: 05/25/23 at 1203 by REX ORO 

-------------------------------------------------------------------------------

RECEIVED ORDER FOR PATIENT TO GO TO SNF FOR CONTINUE OF CARE. [place] ALL [place] [facility] 
[phone] [facility] [address]. SPOKE WITH ERASTO PATIENT WILL BE GOING TO 
ROOM 33A UNDER DR HOLLAND. TRANSPORTATION ARRANGED WITH [employer] [phone] 
WITH A 1500  TIME.TRANSPORT CONFIRMATION #[id number] NURSE CAREN AND WIFE AWARE OF THE 
ABOVE INFORMATION.

## 2023-05-23 NOTE — NUR
Patient resting in bed, A/Ox4, chest rise and fall symmetrical, no s/s of 
distress, patinet on monitor.

-------------------------------------------------------------------------------

Addendum: 05/23/23 at 0518 by [id number]

-------------------------------------------------------------------------------

Patient resting in bed, A/Ox4, chest rise and fall symmetrical, no c/o pain or 
s/s of distress, patinet on monitor.

## 2023-05-23 NOTE — NUR
PATIENT IS AWAKE IN BED WATCHING TV ON 2L NC SATING 99%. NO S/S OF RESPIRATORY DISTRESS. 
BREATHING REGULAR NON LABORED. ALL SAFETY PRECAUTIONS ARE IN PLACE. IVF INFUSING AS ORDERED. 
NEEDS ATTENDED TO.

## 2023-05-23 NOTE — NUR
RECEIEVED PATIENT FROM ED. PATIENT AWAKE COHERENT ADN ASNWERED ALL QUESTI

ON

-------------------------------------------------------------------------------

Addendum: 05/23/23 at 1928 by VIKI MIKE RN

-------------------------------------------------------------------------------

TIME CHART CORRECTION [id number]

## 2023-05-23 NOTE — NUR
Patient resting in bed, A/Ox4, chest rise and fall symmetrical, no c/o pain or 
s/s of distress, patinet on monitor.

## 2023-05-23 NOTE — NUR
Change of shift report given to AM Shift Nurse Doug RIBEIRO. AM Shift Nurse Doug RIBEIRO 
verbalized understanding of report, no further questions.

## 2023-05-24 VITALS — DIASTOLIC BLOOD PRESSURE: 50 MMHG | SYSTOLIC BLOOD PRESSURE: 129 MMHG

## 2023-05-24 VITALS — DIASTOLIC BLOOD PRESSURE: 94 MMHG | SYSTOLIC BLOOD PRESSURE: 150 MMHG

## 2023-05-24 VITALS — SYSTOLIC BLOOD PRESSURE: 149 MMHG | DIASTOLIC BLOOD PRESSURE: 67 MMHG

## 2023-05-24 VITALS — DIASTOLIC BLOOD PRESSURE: 74 MMHG | SYSTOLIC BLOOD PRESSURE: 146 MMHG

## 2023-05-24 LAB
ANION GAP SERPL CALCULATED.3IONS-SCNC: 15.1 MMOL/L (ref 8–16)
BASOPHILS # BLD AUTO: 0 K/UL (ref 0–0.22)
BASOPHILS NFR BLD AUTO: 0.2 % (ref 0–2)
BUN SERPL-MCNC: 13 MG/DL (ref 7–18)
CHLORIDE SERPL-SCNC: 104 MMOL/L (ref 98–107)
CO2 SERPL-SCNC: 24.6 MMOL/L (ref 21–32)
CREAT SERPL-MCNC: 0.8 MG/DL (ref 0.6–1.3)
EOSINOPHIL # BLD AUTO: 0.1 K/UL (ref 0–0.4)
EOSINOPHIL NFR BLD AUTO: 1.6 % (ref 0–4)
ERYTHROCYTE [DISTWIDTH] IN BLOOD BY AUTOMATED COUNT: 13.2 % (ref 11.6–13.7)
GFR SERPL CREATININE-BSD FRML MDRD: (no result) ML/MIN (ref 90–?)
GLUCOSE SERPL-MCNC: 85 MG/DL (ref 74–106)
HCT VFR BLD AUTO: 36 % (ref 36–52)
HGB BLD-MCNC: 12.1 G/DL (ref 12–18)
LYMPHOCYTES # BLD AUTO: 0.9 K/UL (ref 2–11.5)
LYMPHOCYTES NFR BLD AUTO: 11.9 % (ref 20.5–51.1)
MAGNESIUM SERPL-MCNC: 1.7 MG/DL (ref 1.8–2.4)
MCH RBC QN AUTO: 31 PG (ref 27–31)
MCHC RBC AUTO-ENTMCNC: 34 G/DL (ref 33–37)
MCV RBC AUTO: 90.7 FL (ref 80–94)
MONOCYTES # BLD AUTO: 0.7 K/UL (ref 0.8–1)
MONOCYTES NFR BLD AUTO: 9.3 % (ref 1.7–9.3)
NEUTROPHILS # BLD AUTO: 6.1 K/UL (ref 1.8–7.7)
NEUTROPHILS NFR BLD AUTO: 77 % (ref 42.2–75.2)
PHOSPHATE SERPL-MCNC: 2.9 MG/DL (ref 2.5–4.9)
PLATELET # BLD AUTO: 260 K/UL (ref 140–450)
POTASSIUM SERPL-SCNC: 3.7 MMOL/L (ref 3.5–5.1)
RBC # BLD AUTO: 3.97 MIL/UL (ref 4.2–6.1)
SODIUM SERPL-SCNC: 140 MMOL/L (ref 136–145)
WBC # BLD AUTO: 8 K/UL (ref 4.8–10.8)

## 2023-05-24 PROCEDURE — 0DBK8ZX EXCISION OF ASCENDING COLON, VIA NATURAL OR ARTIFICIAL OPENING ENDOSCOPIC, DIAGNOSTIC: ICD-10-PCS | Performed by: INTERNAL MEDICINE

## 2023-05-24 RX ADMIN — SODIUM CHLORIDE, SODIUM LACTATE, POTASSIUM CHLORIDE, AND CALCIUM CHLORIDE SCH MLS/HR: .6; .31; .03; .02 INJECTION, SOLUTION INTRAVENOUS at 08:25

## 2023-05-24 RX ADMIN — SODIUM CHLORIDE, SODIUM LACTATE, POTASSIUM CHLORIDE, AND CALCIUM CHLORIDE SCH MLS/HR: .6; .31; .03; .02 INJECTION, SOLUTION INTRAVENOUS at 18:29

## 2023-05-24 RX ADMIN — MORPHINE SULFATE PRN MG: 2 INJECTION, SOLUTION INTRAMUSCULAR; INTRAVENOUS at 05:30

## 2023-05-24 RX ADMIN — PANTOPRAZOLE SODIUM SCH MG: 40 INJECTION, POWDER, FOR SOLUTION INTRAVENOUS at 20:01

## 2023-05-24 RX ADMIN — PANTOPRAZOLE SODIUM SCH MG: 40 INJECTION, POWDER, FOR SOLUTION INTRAVENOUS at 08:42

## 2023-05-24 RX ADMIN — SODIUM CHLORIDE, SODIUM LACTATE, POTASSIUM CHLORIDE, AND CALCIUM CHLORIDE SCH MLS/HR: .6; .31; .03; .02 INJECTION, SOLUTION INTRAVENOUS at 01:40

## 2023-05-24 NOTE — NUR
RECEIVED A CALL FROM DR. THAKKAR WITH ORDER TO GIVE ONE TIME FLEET ENEMA AT 0800. ORDER 
NOTED , CARRIED OUT.

## 2023-05-24 NOTE — NUR
RECEIVED REPORT FROM NIGHT SHIFT NURSE. PATIENT LYING DOWN IN BED WATCHING TV. NO DISTRESS 
NOTED. DENIES ANY PAIN. IV SITE INTACT, PATENT, INFUSING IVF AS PER MD ORDERS. SKIN INTACT. 
ABD DISTENDED. REVIEWED PLAN OF CARE WITH PATIENT. VERBALIZED UNDERSTANDING. SAFETY MEASURES 
IN PLACE, CALL LIGHT WITHIN REACH. WILL CONTINUE TO MONITOR.

## 2023-05-24 NOTE — NUR
DC PLANNING 



*** ASSESSMENT COMPLETE***



PLEASE REFER TO ASSESSMENT FOR ADDITIONAL DETAILS



TENTATIVE DC PLAN IS FOR PT TO RETURN TO [facility] ONCE MEDICALLY CLEARED BY PHYSICIAN. PHYSICIAN 
TO SPEAK TO PT AND FAMILY ABOUT PALLIATIVE CARE.

-------------------------------------------------------------------------------

Addendum: 05/24/23 at 1418 by Staci GRACE

-------------------------------------------------------------------------------

Amended: Links added.

## 2023-05-24 NOTE — NUR
RECEIVED PT IN BED ASLEEP, EASILY AWAKEN BY VERBAL STIMULI. DENIES PAIN AT THIS TIME. NO 
ACUTE RESPIRATORY DISTRESS. SKIN WARM AND DRY TO TOUCH. SAFETY PRECAUTIONS IN PLACE, CALL 
LIGHT IN REACH.

## 2023-05-24 NOTE — NUR
5/24/23 RD INITIAL ASSESSMENT COMPLETED



PLEASE REFER TO NUTRITION ASSESSMENT UNDER CARE ACTIVITY FOR ESTIMATED NUTRITIONAL NEEDS. 



1. MONITOR NPO STATUS

2. RECOMMEND CARDIAC DIET WHEN/IF MEDICALLY APPROPRIATE

3. RD TO FOLLOW-UP 3-5 DAYS, MODERATE RISK 



REVIEWED BY CASSIDY HARRISON RD

## 2023-05-25 VITALS — SYSTOLIC BLOOD PRESSURE: 119 MMHG | DIASTOLIC BLOOD PRESSURE: 56 MMHG

## 2023-05-25 LAB
ALBUMIN FLD-MCNC: 2.7 G/DL (ref 3.4–5)
ANION GAP SERPL CALCULATED.3IONS-SCNC: 15.4 MMOL/L (ref 8–16)
AST SERPL-CCNC: 18 U/L (ref 15–37)
BASOPHILS # BLD AUTO: 0 K/UL (ref 0–0.22)
BASOPHILS NFR BLD AUTO: 0.4 % (ref 0–2)
BILIRUB SERPL-MCNC: 0.7 MG/DL (ref 0–1)
BUN SERPL-MCNC: 10 MG/DL (ref 7–18)
CHLORIDE SERPL-SCNC: 104 MMOL/L (ref 98–107)
CO2 SERPL-SCNC: 23.1 MMOL/L (ref 21–32)
CREAT SERPL-MCNC: -0.5 MG/DL (ref 0.6–1.3)
EOSINOPHIL # BLD AUTO: 0.2 K/UL (ref 0–0.4)
EOSINOPHIL NFR BLD AUTO: 2.2 % (ref 0–4)
ERYTHROCYTE [DISTWIDTH] IN BLOOD BY AUTOMATED COUNT: 13.4 % (ref 11.6–13.7)
GFR SERPL CREATININE-BSD FRML MDRD: (no result) ML/MIN (ref 90–?)
GLUCOSE SERPL-MCNC: 93 MG/DL (ref 74–106)
HCT VFR BLD AUTO: 34.4 % (ref 36–52)
HGB BLD-MCNC: 11.4 G/DL (ref 12–18)
LYMPHOCYTES # BLD AUTO: 0.8 K/UL (ref 2–11.5)
LYMPHOCYTES NFR BLD AUTO: 9.1 % (ref 20.5–51.1)
MAGNESIUM SERPL-MCNC: 1.8 MG/DL (ref 1.8–2.4)
MCH RBC QN AUTO: 30 PG (ref 27–31)
MCHC RBC AUTO-ENTMCNC: 33 G/DL (ref 33–37)
MCV RBC AUTO: 91.5 FL (ref 80–94)
MONOCYTES # BLD AUTO: 0.8 K/UL (ref 0.8–1)
MONOCYTES NFR BLD AUTO: 9.2 % (ref 1.7–9.3)
NEUTROPHILS # BLD AUTO: 6.6 K/UL (ref 1.8–7.7)
NEUTROPHILS NFR BLD AUTO: 79.1 % (ref 42.2–75.2)
PHOSPHATE SERPL-MCNC: 2.9 MG/DL (ref 2.5–4.9)
PLATELET # BLD AUTO: 238 K/UL (ref 140–450)
POTASSIUM SERPL-SCNC: 3.5 MMOL/L (ref 3.5–5.1)
RBC # BLD AUTO: 3.76 MIL/UL (ref 4.2–6.1)
SODIUM SERPL-SCNC: 139 MMOL/L (ref 136–145)
WBC # BLD AUTO: 8.4 K/UL (ref 4.8–10.8)

## 2023-05-25 RX ADMIN — MORPHINE SULFATE PRN MG: 2 INJECTION, SOLUTION INTRAMUSCULAR; INTRAVENOUS at 03:18

## 2023-05-25 RX ADMIN — SODIUM CHLORIDE, SODIUM LACTATE, POTASSIUM CHLORIDE, AND CALCIUM CHLORIDE SCH MLS/HR: .6; .31; .03; .02 INJECTION, SOLUTION INTRAVENOUS at 09:02

## 2023-05-25 RX ADMIN — PANTOPRAZOLE SODIUM SCH MG: 40 INJECTION, POWDER, FOR SOLUTION INTRAVENOUS at 09:04

## 2023-05-25 NOTE — NUR
RECEIVED REPORT FROM NIGHT SHIFT NURSE FOR CONTINUITY OF CARE. PT IS ASLEEP AWAKEN BY NAME, 
NO SIGNS OF DISTRESS. CALL BELL WITHIN REACH. WILL CONTINUE TO MONITOR.

## 2023-05-25 NOTE — NUR
PT DISCHARGED TO [facility] , DISCHARGE INSTRUCTION GIVEN, IV AND ID BAND REMOVED, PT LEFT THE UNIT 
WITHOUT ANY SOB OR DISCOMFORT.[id number]

## 2023-05-25 NOTE — NUR
PATIENT IS ASLEEP. NO DISTRESS NOTED. ALL NEEDS ATTENDED TO. SAFETY PRECAUTIONS MAINTAINED 
DURING THE SHIFT, CALL LIGHT AND URINAL REMAINS WITHIN REACH.

## 2023-06-01 ENCOUNTER — HOSPITAL ENCOUNTER (EMERGENCY)
Dept: HOSPITAL 26 - MED | Age: 73
Discharge: NURSING, REHAB OR LONG-TERM CARE | End: 2023-06-01
Payer: COMMERCIAL

## 2023-06-01 VITALS — HEIGHT: 62 IN | BODY MASS INDEX: 23.19 KG/M2 | WEIGHT: 126 LBS

## 2023-06-01 VITALS — SYSTOLIC BLOOD PRESSURE: 146 MMHG | DIASTOLIC BLOOD PRESSURE: 66 MMHG

## 2023-06-01 VITALS — DIASTOLIC BLOOD PRESSURE: 78 MMHG | SYSTOLIC BLOOD PRESSURE: 145 MMHG

## 2023-06-01 DIAGNOSIS — J44.9: ICD-10-CM

## 2023-06-01 DIAGNOSIS — D65: ICD-10-CM

## 2023-06-01 DIAGNOSIS — Z79.899: ICD-10-CM

## 2023-06-01 DIAGNOSIS — I10: ICD-10-CM

## 2023-06-01 DIAGNOSIS — Z85.038: ICD-10-CM

## 2023-06-01 DIAGNOSIS — K92.2: Primary | ICD-10-CM

## 2023-06-01 DIAGNOSIS — E78.5: ICD-10-CM

## 2023-06-01 DIAGNOSIS — Z20.822: ICD-10-CM

## 2023-06-01 DIAGNOSIS — I82.463: ICD-10-CM

## 2023-06-01 LAB
ALBUMIN FLD-MCNC: 3.2 G/DL (ref 3.4–5)
ANION GAP SERPL CALCULATED.3IONS-SCNC: 15.5 MMOL/L (ref 8–16)
APPEARANCE UR: CLEAR
AST SERPL-CCNC: 36 U/L (ref 15–37)
BASOPHILS # BLD AUTO: 0 K/UL (ref 0–0.22)
BASOPHILS NFR BLD AUTO: 0.2 % (ref 0–2)
BILIRUB SERPL-MCNC: 4.1 MG/DL (ref 0–1)
BILIRUB UR QL STRIP: (no result)
BUN SERPL-MCNC: 19 MG/DL (ref 7–18)
CHLORIDE SERPL-SCNC: 99 MMOL/L (ref 98–107)
CO2 SERPL-SCNC: 29.1 MMOL/L (ref 21–32)
COLOR UR: YELLOW
CREAT SERPL-MCNC: 0.8 MG/DL (ref 0.6–1.3)
EOSINOPHIL # BLD AUTO: 0 K/UL (ref 0–0.4)
EOSINOPHIL NFR BLD AUTO: 0.3 % (ref 0–4)
ERYTHROCYTE [DISTWIDTH] IN BLOOD BY AUTOMATED COUNT: 13.6 % (ref 11.6–13.7)
GFR SERPL CREATININE-BSD FRML MDRD: (no result) ML/MIN (ref 90–?)
GLUCOSE SERPL-MCNC: 119 MG/DL (ref 74–106)
GLUCOSE UR STRIP-MCNC: NEGATIVE MG/DL
HCT VFR BLD AUTO: 36.3 % (ref 36–52)
HGB BLD-MCNC: 12.4 G/DL (ref 12–18)
HGB UR QL STRIP: NEGATIVE
LEUKOCYTE ESTERASE UR QL STRIP: NEGATIVE
LIPASE SERPL-CCNC: 90 U/L (ref 73–393)
LYMPHOCYTES # BLD AUTO: 0.5 K/UL (ref 2–11.5)
LYMPHOCYTES NFR BLD AUTO: 5.3 % (ref 20.5–51.1)
MCH RBC QN AUTO: 31 PG (ref 27–31)
MCHC RBC AUTO-ENTMCNC: 34 G/DL (ref 33–37)
MCV RBC AUTO: 90.5 FL (ref 80–94)
MONOCYTES # BLD AUTO: 1 K/UL (ref 0.8–1)
MONOCYTES NFR BLD AUTO: 9.9 % (ref 1.7–9.3)
NEUTROPHILS # BLD AUTO: 8.7 K/UL (ref 1.8–7.7)
NEUTROPHILS NFR BLD AUTO: 84.3 % (ref 42.2–75.2)
NITRITE UR QL STRIP: NEGATIVE
PH UR STRIP: 6 [PH] (ref 5–9)
PLATELET # BLD AUTO: 301 K/UL (ref 140–450)
POTASSIUM SERPL-SCNC: 3.6 MMOL/L (ref 3.5–5.1)
PROTHROMBIN TIME: 17.2 SECS (ref 10.8–13.4)
RBC # BLD AUTO: 4.01 MIL/UL (ref 4.2–6.1)
RBC #/AREA URNS HPF: (no result) /HPF (ref 0–5)
SODIUM SERPL-SCNC: 140 MMOL/L (ref 136–145)
WBC # BLD AUTO: 10.3 K/UL (ref 4.8–10.8)

## 2023-06-01 PROCEDURE — 96375 TX/PRO/DX INJ NEW DRUG ADDON: CPT

## 2023-06-01 PROCEDURE — 36415 COLL VENOUS BLD VENIPUNCTURE: CPT

## 2023-06-01 PROCEDURE — 80053 COMPREHEN METABOLIC PANEL: CPT

## 2023-06-01 PROCEDURE — 71045 X-RAY EXAM CHEST 1 VIEW: CPT

## 2023-06-01 PROCEDURE — 87426 SARSCOV CORONAVIRUS AG IA: CPT

## 2023-06-01 PROCEDURE — 81001 URINALYSIS AUTO W/SCOPE: CPT

## 2023-06-01 PROCEDURE — 96374 THER/PROPH/DIAG INJ IV PUSH: CPT

## 2023-06-01 PROCEDURE — 85025 COMPLETE CBC W/AUTO DIFF WBC: CPT

## 2023-06-01 PROCEDURE — 93005 ELECTROCARDIOGRAM TRACING: CPT

## 2023-06-01 PROCEDURE — 85610 PROTHROMBIN TIME: CPT

## 2023-06-01 PROCEDURE — 83880 ASSAY OF NATRIURETIC PEPTIDE: CPT

## 2023-06-01 PROCEDURE — 74176 CT ABD & PELVIS W/O CONTRAST: CPT

## 2023-06-01 PROCEDURE — 86886 COOMBS TEST INDIRECT TITER: CPT

## 2023-06-01 PROCEDURE — 99291 CRITICAL CARE FIRST HOUR: CPT

## 2023-06-01 PROCEDURE — 86901 BLOOD TYPING SEROLOGIC RH(D): CPT

## 2023-06-01 PROCEDURE — 83690 ASSAY OF LIPASE: CPT

## 2023-06-01 PROCEDURE — 85730 THROMBOPLASTIN TIME PARTIAL: CPT

## 2023-06-01 PROCEDURE — C9113 INJ PANTOPRAZOLE SODIUM, VIA: HCPCS

## 2023-06-01 PROCEDURE — 86900 BLOOD TYPING SEROLOGIC ABO: CPT

## 2023-06-01 PROCEDURE — 84484 ASSAY OF TROPONIN QUANT: CPT

## 2023-06-01 NOTE — NUR
N/V WITH COFFEE GROUND EMESIS, ZOFRAN NOT EFFECTIVE, MD MADE AWARE, ADDITIONAL 
ORDERS RECEIVED AND CARRIED OUT

## 2023-06-03 ENCOUNTER — HOSPITAL ENCOUNTER (INPATIENT)
Dept: HOSPITAL 26 - MED | Age: 73
LOS: 4 days | Discharge: NURSING, REHAB OR LONG-TERM CARE | DRG: 871 | End: 2023-06-07
Payer: COMMERCIAL

## 2023-06-03 VITALS — SYSTOLIC BLOOD PRESSURE: 151 MMHG | DIASTOLIC BLOOD PRESSURE: 91 MMHG

## 2023-06-03 VITALS — HEIGHT: 67 IN | WEIGHT: 129 LBS | BODY MASS INDEX: 20.25 KG/M2

## 2023-06-03 DIAGNOSIS — I82.220: ICD-10-CM

## 2023-06-03 DIAGNOSIS — C18.9: ICD-10-CM

## 2023-06-03 DIAGNOSIS — Z79.2: ICD-10-CM

## 2023-06-03 DIAGNOSIS — E87.0: ICD-10-CM

## 2023-06-03 DIAGNOSIS — A41.9: Primary | ICD-10-CM

## 2023-06-03 DIAGNOSIS — C78.89: ICD-10-CM

## 2023-06-03 DIAGNOSIS — C78.6: ICD-10-CM

## 2023-06-03 DIAGNOSIS — K56.609: ICD-10-CM

## 2023-06-03 DIAGNOSIS — E43: ICD-10-CM

## 2023-06-03 DIAGNOSIS — E87.3: ICD-10-CM

## 2023-06-03 DIAGNOSIS — N40.0: ICD-10-CM

## 2023-06-03 DIAGNOSIS — Z20.822: ICD-10-CM

## 2023-06-03 DIAGNOSIS — E78.5: ICD-10-CM

## 2023-06-03 DIAGNOSIS — Z85.038: ICD-10-CM

## 2023-06-03 DIAGNOSIS — J44.9: ICD-10-CM

## 2023-06-03 DIAGNOSIS — C78.4: ICD-10-CM

## 2023-06-03 DIAGNOSIS — J96.00: ICD-10-CM

## 2023-06-03 DIAGNOSIS — Z79.899: ICD-10-CM

## 2023-06-03 DIAGNOSIS — D64.9: ICD-10-CM

## 2023-06-03 DIAGNOSIS — R65.20: ICD-10-CM

## 2023-06-03 DIAGNOSIS — N17.0: ICD-10-CM

## 2023-06-03 LAB
ALBUMIN FLD-MCNC: 3.4 G/DL (ref 3.4–5)
ANION GAP SERPL CALCULATED.3IONS-SCNC: 12.2 MMOL/L (ref 8–16)
AST SERPL-CCNC: 49 U/L (ref 15–37)
BASOPHILS # BLD AUTO: 0 K/UL (ref 0–0.22)
BASOPHILS NFR BLD AUTO: 0.1 % (ref 0–2)
BILIRUB SERPL-MCNC: 5.5 MG/DL (ref 0–1)
BUN SERPL-MCNC: 34 MG/DL (ref 7–18)
CHLORIDE SERPL-SCNC: 100 MMOL/L (ref 98–107)
CO2 SERPL-SCNC: 37.3 MMOL/L (ref 21–32)
CREAT SERPL-MCNC: 1 MG/DL (ref 0.6–1.3)
EOSINOPHIL # BLD AUTO: 0.1 K/UL (ref 0–0.4)
EOSINOPHIL NFR BLD AUTO: 0.4 % (ref 0–4)
ERYTHROCYTE [DISTWIDTH] IN BLOOD BY AUTOMATED COUNT: 13.8 % (ref 11.6–13.7)
GFR SERPL CREATININE-BSD FRML MDRD: (no result) ML/MIN (ref 90–?)
GLUCOSE SERPL-MCNC: 128 MG/DL (ref 74–106)
HCT VFR BLD AUTO: 39.1 % (ref 36–52)
HGB BLD-MCNC: 13 G/DL (ref 12–18)
LIPASE SERPL-CCNC: 153 U/L (ref 73–393)
LYMPHOCYTES # BLD AUTO: 0.7 K/UL (ref 2–11.5)
LYMPHOCYTES NFR BLD AUTO: 5.9 % (ref 20.5–51.1)
MCH RBC QN AUTO: 30 PG (ref 27–31)
MCHC RBC AUTO-ENTMCNC: 33 G/DL (ref 33–37)
MCV RBC AUTO: 91.7 FL (ref 80–94)
MONOCYTES # BLD AUTO: 2.1 K/UL (ref 0.8–1)
MONOCYTES NFR BLD AUTO: 16.9 % (ref 1.7–9.3)
NEUTROPHILS # BLD AUTO: 9.3 K/UL (ref 1.8–7.7)
NEUTROPHILS NFR BLD AUTO: 76.7 % (ref 42.2–75.2)
PLATELET # BLD AUTO: 300 K/UL (ref 140–450)
POTASSIUM SERPL-SCNC: 3.5 MMOL/L (ref 3.5–5.1)
RBC # BLD AUTO: 4.27 MIL/UL (ref 4.2–6.1)
SODIUM SERPL-SCNC: 146 MMOL/L (ref 136–145)
WBC # BLD AUTO: 12.1 K/UL (ref 4.8–10.8)

## 2023-06-03 PROCEDURE — C9113 INJ PANTOPRAZOLE SODIUM, VIA: HCPCS

## 2023-06-04 VITALS — DIASTOLIC BLOOD PRESSURE: 73 MMHG | SYSTOLIC BLOOD PRESSURE: 108 MMHG

## 2023-06-04 VITALS — DIASTOLIC BLOOD PRESSURE: 100 MMHG | SYSTOLIC BLOOD PRESSURE: 101 MMHG

## 2023-06-04 VITALS — DIASTOLIC BLOOD PRESSURE: 79 MMHG | SYSTOLIC BLOOD PRESSURE: 118 MMHG

## 2023-06-04 VITALS — SYSTOLIC BLOOD PRESSURE: 99 MMHG | DIASTOLIC BLOOD PRESSURE: 67 MMHG

## 2023-06-04 VITALS — DIASTOLIC BLOOD PRESSURE: 58 MMHG | SYSTOLIC BLOOD PRESSURE: 87 MMHG

## 2023-06-04 VITALS — DIASTOLIC BLOOD PRESSURE: 51 MMHG | SYSTOLIC BLOOD PRESSURE: 86 MMHG

## 2023-06-04 VITALS — DIASTOLIC BLOOD PRESSURE: 70 MMHG | SYSTOLIC BLOOD PRESSURE: 102 MMHG

## 2023-06-04 VITALS — SYSTOLIC BLOOD PRESSURE: 122 MMHG | DIASTOLIC BLOOD PRESSURE: 100 MMHG

## 2023-06-04 VITALS — SYSTOLIC BLOOD PRESSURE: 102 MMHG | DIASTOLIC BLOOD PRESSURE: 70 MMHG

## 2023-06-04 VITALS — SYSTOLIC BLOOD PRESSURE: 90 MMHG | DIASTOLIC BLOOD PRESSURE: 57 MMHG

## 2023-06-04 VITALS — SYSTOLIC BLOOD PRESSURE: 101 MMHG | DIASTOLIC BLOOD PRESSURE: 67 MMHG

## 2023-06-04 VITALS — DIASTOLIC BLOOD PRESSURE: 84 MMHG | SYSTOLIC BLOOD PRESSURE: 137 MMHG

## 2023-06-04 VITALS — DIASTOLIC BLOOD PRESSURE: 73 MMHG | SYSTOLIC BLOOD PRESSURE: 112 MMHG

## 2023-06-04 LAB
ALBUMIN FLD-MCNC: 2.7 G/DL (ref 3.4–5)
ANION GAP SERPL CALCULATED.3IONS-SCNC: 15.2 MMOL/L (ref 8–16)
APPEARANCE UR: CLEAR
AST SERPL-CCNC: 97 U/L (ref 15–37)
BASOPHILS NFR BLD MANUAL: 0 % (ref 0–2)
BILIRUB SERPL-MCNC: 4 MG/DL (ref 0–1)
BILIRUB UR QL STRIP: (no result)
BUN SERPL-MCNC: 59 MG/DL (ref 7–18)
CHLORIDE SERPL-SCNC: 107 MMOL/L (ref 98–107)
CO2 SERPL-SCNC: 31.8 MMOL/L (ref 21–32)
COLOR UR: YELLOW
CREAT SERPL-MCNC: 2.5 MG/DL (ref 0.6–1.3)
EOSINOPHIL NFR BLD MANUAL: 0 % (ref 0–4)
ERYTHROCYTE [DISTWIDTH] IN BLOOD BY AUTOMATED COUNT: 14.5 % (ref 11.6–13.7)
GFR SERPL CREATININE-BSD FRML MDRD: (no result) ML/MIN (ref 90–?)
GLUCOSE SERPL-MCNC: 121 MG/DL (ref 74–106)
GLUCOSE UR STRIP-MCNC: NEGATIVE MG/DL
HCT VFR BLD AUTO: 32.7 % (ref 36–52)
HGB BLD-MCNC: 10.8 G/DL (ref 12–18)
HGB UR QL STRIP: NEGATIVE
LEUKOCYTE ESTERASE UR QL STRIP: NEGATIVE
LYMPHOCYTES NFR BLD MANUAL: 7 % (ref 20–46)
MCH RBC QN AUTO: 31 PG (ref 27–31)
MCHC RBC AUTO-ENTMCNC: 33 G/DL (ref 33–37)
MCV RBC AUTO: 93 FL (ref 80–94)
MONOCYTES NFR BLD MANUAL: 15 % (ref 5–12)
NITRITE UR QL STRIP: NEGATIVE
PH UR STRIP: 6 [PH] (ref 5–9)
PLATELET # BLD AUTO: 256 K/UL (ref 140–450)
POTASSIUM SERPL-SCNC: 4 MMOL/L (ref 3.5–5.1)
RBC # BLD AUTO: 3.51 MIL/UL (ref 4.2–6.1)
SODIUM SERPL-SCNC: 150 MMOL/L (ref 136–145)
WBC # BLD AUTO: 13.2 K/UL (ref 4.8–10.8)

## 2023-06-04 RX ADMIN — PANTOPRAZOLE SODIUM SCH MG: 40 INJECTION, POWDER, FOR SOLUTION INTRAVENOUS at 08:56

## 2023-06-04 RX ADMIN — DEXTROSE SCH MLS/HR: 50 INJECTION, SOLUTION INTRAVENOUS at 20:01

## 2023-06-04 RX ADMIN — SODIUM CHLORIDE SCH MLS/HR: 9 INJECTION, SOLUTION INTRAVENOUS at 04:45

## 2023-06-04 RX ADMIN — PANTOPRAZOLE SODIUM SCH MG: 40 INJECTION, POWDER, FOR SOLUTION INTRAVENOUS at 21:00

## 2023-06-04 RX ADMIN — SODIUM CHLORIDE SCH MLS/HR: 9 INJECTION, SOLUTION INTRAVENOUS at 12:45

## 2023-06-04 RX ADMIN — SODIUM CHLORIDE, SODIUM LACTATE, POTASSIUM CHLORIDE, AND CALCIUM CHLORIDE SCH MLS/HR: .6; .31; .03; .02 INJECTION, SOLUTION INTRAVENOUS at 19:30

## 2023-06-04 NOTE — NUR
RECEIVED REPORT FROM DAY SHIFT NURSE. PT IS AWAKE AND ALERT. ON CARDIAC MONITOR SHOWING ST 
. PERIPHERAL IV TO LT AC 20G, INFUSING NS @ 100ML/H, IV TO RT FOREARM, 20G INFUSING 
PROTONIX @ 10ML/H. NPO. PT CONNECTED TO PULSE OX WITH SPO2 % ON 2L NASAL CANNULA. PT 
HAS NG TUBE IN, ON LOW INTERMITTENT SUCTION WITH BLACK COLORED OUTPUT ON TUBE.

## 2023-06-04 NOTE — NUR
MD WAS PAGED DUE TO HEART RATE IN THE 140S. 1L NS BOLUS ORDERED, AS WELL AS CONSULT WITH 
SHANNON AND PULM. ORDERS PLACED AND FOLLOWED THROUGH. UPON STARTING NS BOLUS, PT BEGAN GAGGING. 
PRN ANTIEMETIC MEDICATION ADMINISTERED PER MD ORDER. ALL SAFETY MEASURES IN PLACE. FAMILY AT 
BEDSIDE

## 2023-06-04 NOTE — NUR
NG TUBE INSERTED. STAT CXR ORDERED FOR PLACEMENT. WILL PLACE ON IN LOW INTERMITTENT SUCTION 
ONCE PLACEMENT IS CONFIRMED.

## 2023-06-04 NOTE — NUR
PATIENT HAS BEEN SCREENED AND CATEGORIZED AS MODERATE NUTRITION RISK. PATIENT WILL BE SEEN 
WITHIN 3-5 DAYS OF ADMISSION.



6/4/23-6/9/23



MICHEAL RAMIREZ RD

## 2023-06-04 NOTE — NUR
PAGED BIJAN REGARDING PTS 2200 CC OF BLACK OUTPUT FROM NG TUBE. ALSO PAGED DR ORTEGA 
REGARDING PTS HEART RATE AND BLOOD PRESSURE. DR ORTEGA AGREES WITH 1L BOLUS, NO OTHER ORDERS 
AT THIS TIME.

## 2023-06-04 NOTE — NUR
PT TRANSFERRED VIA BED FROM TELE 108B TO ICU BED 3. REPORT RECEIVED FROM ROSITA RIBEIRO. PT IS 
DROWSY, NO RESPONSE. ON NASAL CANNULA 4L. ST ON MONITOR, 137. PERIPHERAL IV TO LT AC 20G, 
INFUSING NS @ 100ML/H, IV TO LT FOREARM, 20G INFUSING PROTONIX @ 10ML/H. NPO. INCONTINENT, 
DIAPER IN USE. GENERALIZED WEAKNESS, BEDREST. PT HAD 2200 BLACK OUTPUT FROM NG TUBE, LOW BP, 
TACHYCARDIA, 1L NS BOLUS, GIVEN AT 1420 BY TELE RN.

## 2023-06-04 NOTE — NUR
RECEIVED REPORT FROM NIGHT SHIFT NURSEFRAN /79, ON TELE MONITOR SHOWING ST . PT 
HAS NG TUBE IN, ON LOW INTERMITTENT SUCTION. PT AWAKE WITH HEAVY SHAKING, NIGHT SHIFT 
ENDORSED PT BEING HARD OF HEARING. ALL SAFETY MEASURES IN PLACE, CALL LIGHT WITHIN REACH.

## 2023-06-04 NOTE — NUR
Admitted from ED, with chief complaint of coffee ground emesis, chest and abdominal pain. 

72 y/o ,Male, Cooperative, on 2L o2 via nc, sating at 99%. IV on R FA, g22 and LAC g22. Pt 
is currently vomiting. 

oriented to call light, bed, phone,television, bathroom, smoking policy,

visiting hours, procedures, ID bracelet on. Belongings list checked.

## 2023-06-04 NOTE — NUR
[facility] Adm[facility] to telemetry .  Will go to 
room 108 B. Belongings list completed.  Report to [employer].

## 2023-06-04 NOTE — NUR
AT 1410, DR ANDRADE WAS PAGED REGARDING PTS HEART RATE BEING TACHY IN THE 140S. ORDERS 
RECEIVED AND CARRIED OUT, BOLUS STARTED AT 1420. AT 1450, BLOOD PRESSURE WAS REASSESSED AND 
IT /73, HEART RATE STILL 140S, DR ANDRADE MADE AWARE. CNA OBTAINED BLOOD PRESSURE AT 
1540, READING 101/67 HEART RATE 140. AT 1604, RN REASSESSED PT, PT LETHARGIC, UNABLE TO 
ANSWER QUESTIONS OR PROVIDE NAME. CHANGE IN STATUS FROM AM WHEN PT WAS A&OX4. BLOOD PRESSURE 
86/51, THEN 87/58, THEN 90/57, HEART RATE GOING AS HIGH . DR ANDRADE PAGED, CHARGE 
NURSE MIAH RECEIVED TORB FOR ICU TRANSFER. PT WIFE MADE AWARE OF CHANGE AND POC. PT WAS 
PREPARED AND TRANSFERRED TO ICU BED 3, REPORT GIVEN TO JESSENIA RIBEIRO.

## 2023-06-04 NOTE — NUR
FAMILY AT BEDSIDE, ALL QUESTIONS ANSWERED. PT STATES HE FEEL SOB, CONNECTED TO PULSE OX, 
SPO2 % ON 2L NC. NO ACUTE S/S OF DISTRESS, CHEST RISING AND FALLING EVEN AND 
UNLABORED. EDUCATED ON NG TUBE CAUSING POSSIBLE IRRITATION. ALL OF FAMILY QUESTIONS 
ANSWERED.

## 2023-06-05 VITALS — SYSTOLIC BLOOD PRESSURE: 103 MMHG | DIASTOLIC BLOOD PRESSURE: 77 MMHG

## 2023-06-05 VITALS — DIASTOLIC BLOOD PRESSURE: 68 MMHG | SYSTOLIC BLOOD PRESSURE: 102 MMHG

## 2023-06-05 VITALS — SYSTOLIC BLOOD PRESSURE: 111 MMHG | DIASTOLIC BLOOD PRESSURE: 71 MMHG

## 2023-06-05 VITALS — DIASTOLIC BLOOD PRESSURE: 73 MMHG | SYSTOLIC BLOOD PRESSURE: 138 MMHG

## 2023-06-05 VITALS — SYSTOLIC BLOOD PRESSURE: 100 MMHG | DIASTOLIC BLOOD PRESSURE: 61 MMHG

## 2023-06-05 VITALS — SYSTOLIC BLOOD PRESSURE: 110 MMHG | DIASTOLIC BLOOD PRESSURE: 75 MMHG

## 2023-06-05 VITALS — SYSTOLIC BLOOD PRESSURE: 103 MMHG | DIASTOLIC BLOOD PRESSURE: 64 MMHG

## 2023-06-05 LAB
ANION GAP SERPL CALCULATED.3IONS-SCNC: 14.5 MMOL/L (ref 8–16)
ANION GAP SERPL CALCULATED.3IONS-SCNC: 15 MMOL/L (ref 8–16)
BASOPHILS # BLD AUTO: 0 K/UL (ref 0–0.22)
BASOPHILS NFR BLD AUTO: 0.1 % (ref 0–2)
BUN SERPL-MCNC: 65 MG/DL (ref 7–18)
BUN SERPL-MCNC: 69 MG/DL (ref 7–18)
CHLORIDE SERPL-SCNC: 107 MMOL/L (ref 98–107)
CHLORIDE SERPL-SCNC: 107 MMOL/L (ref 98–107)
CO2 SERPL-SCNC: 30.1 MMOL/L (ref 21–32)
CO2 SERPL-SCNC: 31.4 MMOL/L (ref 21–32)
CREAT SERPL-MCNC: 2.7 MG/DL (ref 0.6–1.3)
CREAT SERPL-MCNC: 3.1 MG/DL (ref 0.6–1.3)
EOSINOPHIL # BLD AUTO: 0.1 K/UL (ref 0–0.4)
EOSINOPHIL NFR BLD AUTO: 0.9 % (ref 0–4)
ERYTHROCYTE [DISTWIDTH] IN BLOOD BY AUTOMATED COUNT: 14.5 % (ref 11.6–13.7)
GFR SERPL CREATININE-BSD FRML MDRD: (no result) ML/MIN (ref 90–?)
GFR SERPL CREATININE-BSD FRML MDRD: (no result) ML/MIN (ref 90–?)
GLUCOSE SERPL-MCNC: 141 MG/DL (ref 74–106)
GLUCOSE SERPL-MCNC: 155 MG/DL (ref 74–106)
HCT VFR BLD AUTO: 35.4 % (ref 36–52)
HGB BLD-MCNC: 11.4 G/DL (ref 12–18)
LYMPHOCYTES # BLD AUTO: 0.7 K/UL (ref 2–11.5)
LYMPHOCYTES NFR BLD AUTO: 5.7 % (ref 20.5–51.1)
MAGNESIUM SERPL-MCNC: 2.6 MG/DL (ref 1.8–2.4)
MCH RBC QN AUTO: 30 PG (ref 27–31)
MCHC RBC AUTO-ENTMCNC: 32 G/DL (ref 33–37)
MCV RBC AUTO: 93.1 FL (ref 80–94)
MONOCYTES # BLD AUTO: 2.3 K/UL (ref 0.8–1)
MONOCYTES NFR BLD AUTO: 17.7 % (ref 1.7–9.3)
NEUTROPHILS # BLD AUTO: 9.9 K/UL (ref 1.8–7.7)
NEUTROPHILS NFR BLD AUTO: 75.6 % (ref 42.2–75.2)
PHOSPHATE SERPL-MCNC: 6.1 MG/DL (ref 2.5–4.9)
PLATELET # BLD AUTO: 265 K/UL (ref 140–450)
POTASSIUM SERPL-SCNC: 3.9 MMOL/L (ref 3.5–5.1)
POTASSIUM SERPL-SCNC: 4.1 MMOL/L (ref 3.5–5.1)
RBC # BLD AUTO: 3.81 MIL/UL (ref 4.2–6.1)
SODIUM SERPL-SCNC: 148 MMOL/L (ref 136–145)
SODIUM SERPL-SCNC: 149 MMOL/L (ref 136–145)
WBC # BLD AUTO: 13.1 K/UL (ref 4.8–10.8)

## 2023-06-05 PROCEDURE — 0D9680Z DRAINAGE OF STOMACH WITH DRAINAGE DEVICE, VIA NATURAL OR ARTIFICIAL OPENING ENDOSCOPIC: ICD-10-PCS | Performed by: INTERNAL MEDICINE

## 2023-06-05 PROCEDURE — 0DJ08ZZ INSPECTION OF UPPER INTESTINAL TRACT, VIA NATURAL OR ARTIFICIAL OPENING ENDOSCOPIC: ICD-10-PCS | Performed by: INTERNAL MEDICINE

## 2023-06-05 RX ADMIN — DEXTROSE SCH MLS/HR: 50 INJECTION, SOLUTION INTRAVENOUS at 04:57

## 2023-06-05 RX ADMIN — DEXTROSE SCH MLS/HR: 50 INJECTION, SOLUTION INTRAVENOUS at 13:38

## 2023-06-05 RX ADMIN — PANTOPRAZOLE SODIUM SCH MG: 40 INJECTION, POWDER, FOR SOLUTION INTRAVENOUS at 09:56

## 2023-06-05 RX ADMIN — PANTOPRAZOLE SODIUM SCH MG: 40 INJECTION, POWDER, FOR SOLUTION INTRAVENOUS at 21:03

## 2023-06-05 RX ADMIN — SODIUM CHLORIDE, SODIUM LACTATE, POTASSIUM CHLORIDE, AND CALCIUM CHLORIDE SCH MLS/HR: .6; .31; .03; .02 INJECTION, SOLUTION INTRAVENOUS at 17:10

## 2023-06-05 RX ADMIN — SODIUM CHLORIDE SCH MLS/HR: 9 INJECTION, SOLUTION INTRAVENOUS at 19:27

## 2023-06-05 RX ADMIN — SODIUM CHLORIDE, SODIUM LACTATE, POTASSIUM CHLORIDE, AND CALCIUM CHLORIDE SCH MLS/HR: .6; .31; .03; .02 INJECTION, SOLUTION INTRAVENOUS at 19:10

## 2023-06-05 RX ADMIN — DEXTROSE SCH MLS/HR: 50 INJECTION, SOLUTION INTRAVENOUS at 21:03

## 2023-06-05 RX ADMIN — SODIUM CHLORIDE SCH MLS/HR: 9 INJECTION, SOLUTION INTRAVENOUS at 19:42

## 2023-06-05 RX ADMIN — SODIUM CHLORIDE, SODIUM LACTATE, POTASSIUM CHLORIDE, AND CALCIUM CHLORIDE SCH MLS/HR: .6; .31; .03; .02 INJECTION, SOLUTION INTRAVENOUS at 04:37

## 2023-06-05 NOTE — NUR
RECEIVED PATIENT FROM NIGHT SHIFT NURSES QUINTIN PEACE AND ELIU RIBEIRO. PATIENT ALERT AND ABLE TO 
FOLLOW SIMPLE COMMANDS. PERRL NOTED. NO ADVENTITIOUS LUNG SOUNDS HEARD UPON AUSCULTATION, 
CURRENTLY ON 4L NASAL CANULA AND O2 SATURATION AT 96%. SINUS TACHY ON MONITOR. ABDOMEN IS 
TENDER UPON TOUCH WITH ABDOMINAL QUADRANTS HYPOACTIVE. PATIENT HAS NGT TO SUCTION. PATIENT 
HAS LEFT 20G IV ON FOREARM CURRENTLY INFUSING LACTATED RINGERS  ML/HR. RIGHT 22G IV ON 
FOREARM. NO SKIN BREAKDOWN NOTED. STANDARD PRECAUTION WITH HOB 30 DEGREE FOR ASPIRATION 
PRECAUTION, BD WHEELS LOCKED AND IN LOWEST POSITION.

## 2023-06-05 NOTE — NUR
RECEIVED ON SUPPLEMENTAL OXYGEN AT 4 LPM VIA NC SATURATION 100%; TITRATED FIO2 TO 2 LPM 
ALEXA/ICU RN NOTIFIED

## 2023-06-05 NOTE — NUR
DC PLANNING

A 73Y.O.[place] MALE PATIENT RESIDENT OF [facility] READMITTED FOR COFFEE GROUND EMESIS,CHEST AND 
STOMACH PAIN 6/4/23.WAS HERE AT [facility] 6/1 FOR THE SAME PROBLEMS.PATIENT HAS HX OF COLON CA 
,COPD,BPH AND HLD.ADMITTED TO ICU FOR TACHYCARDIA AND HYPOTENSION.RESPONDED TO IV 
HYDRATION.CXR WITH BILATERAL SMALL PLEURAL EFFUSION.PULMO ON BOARD.TROPONIN LEVEL NORMAL.EF 
55-60%.CARDIO FOLLOWING.CT ABDOMEN/PELVIS SHOWS GASTRIC OUTLET OBSTRUCTION.WITH PENDING 
GASTRIC STENTING PER GI.ON PROTONIX.HGB STABLE.WBC 13.1.ON ZOSYN.DC PLAN- BACK TO [facility] ONCE 
GASTRIC STENTING  IS COMPLETED AND PATIENT CONDITION IMPROVES.CM TO FOLLOW..

-------------------------------------------------------------------------------

Addendum: 06/06/23 at 1550 by Tori Gomes 

-------------------------------------------------------------------------------

HOSPICE EVAL DISCUSSED WITH [facility] [phone].  PER MILENA, THEY WILL NOT BE COVERING SNF 
UNTIL THEY GET A DENIAL LETTER FROM MEDICARE.  DR. ZARCO MADE AWARE.


-------------------------------------------------------------------------------

Addendum: 06/07/23 at 0913 by REX ORO 

-------------------------------------------------------------------------------

RECEIVED ORDER FOR PATIENT TO GO BACK TO SNF AND PROCEED WITH HOSPICE THERE. FAXED ALL 
PAPERWORK TO [facility]. WILL FOLLOW UP WITH RESPONSE FROM [facility].

-------------------------------------------------------------------------------

Addendum: 06/07/23 at 1048 by REX ORO 

-------------------------------------------------------------------------------

RECEIVED AN ORDER FOR PATIENT TO GO BACK TO SNF. FAXED ALL PAPERWORK TO [facility] [phone] 
LOCATED AT [address] [place] [address]. SPOKE WITH ERASTO AND PATIENT WILL BE GOING 
TO ROOM 33A UNDER DR HOLLAND. TRANSPORTATION ARRANGED WITH  [employer] [phone] 
WITH A 1400  TIME. NURSE LUBA AND WIFE VIRGINIA [phone] AWARE OF THE ABOVE 
INFORMATION.

## 2023-06-05 NOTE — NUR
RECEIVED PT ON SUPPLEMENTAL OXYGEN VIA NASAL CANNULA ON 5L PT SATURATION %. TURNED PT 
DOWN TO 3L  SATURATION AT THIS TIME %. RN NOTIFIED . WILL CONTINUE TO MONITOR

## 2023-06-05 NOTE — NUR
PT. WITH LOW RONY SCALE AT MODERATE TO HIGH RISK, CONTINUE TO FOLLOW PRESSURE INJURY 
PREVENTION INTERVENTIONS.

-POSITIONING:

TURN AND REPOSITION PATIENT Q 2H OR SOONER

USE PILLOWS TO KEEP BONY PROMINENCES FROM DIRECT CONTACT WITH SURFACES

USE REPOSITIONING WEDGES TO PROVIDE 30-DEGREE ANGLE FOR SIDE LYING POSITIONS

OFFLOADING OR FOAM DRESSING TO ALL TUBING TO PREVENT MEDICAL DEVICES RELATED PRESSURE INJURY

-RE-EVALUATING AND MANAGING INCONTINENCE

MONITOR SKIN CONDITION DURING POSITION CHANGE

DO NOT MASSAGE REDNESS, BONY PROMINENCES

FREQUENT SUJEY-CARE AND PROVIDE BARRIER CREAMS PRN IF SOILING

MOISTURE CONTROL BY OFFER BED PAN/URINAL /ABSORBENT PAD TO WICK AND HOLD MOISTURE

KEEP SKIN DRY AND PROTECT FROM FRICTION

-MANAGE FRICTION/SHEAR/MOBILITY

KEEP HOB AT THE LOWEST LEVEL OF ELEVATION NO MORE THAN 30 DEGREE UNLESS OTHERWISE 
CONTRAINDICATED

USE LIFT SHEET OR TRANSFER DEVICE TO MOVE PATIENT AND PREVENT LATERAL SHEER.

PROTECT HEELS, ELBOWS BONY PROMINENCES WITH SKIN BERRIES OR FOAM DRESSING IF EXPOSED TO 
FRICTION

OFFLOAD BILATERAL HEELS BY PLACING PILLOWS UNDER CALVES AT ALL TIMES, UNLESS OTHERWISE 
CONTRAINDICATED

-PRESSURE REDISTRIBUTION SURFACE THERAPY SEBASTIAN ISOFLEX MATTRESS

-NUTRITION:

PLEASE FOLLOW RD RECOMMENDATIONS AND OFFER NUTRITION SUPPLEMENTS IF ORDERED.

PLEASE CONTACT WOUND CARE NURSE FOR ANY QUESTION AND CHANGE OF WOUND CONDITION.

## 2023-06-05 NOTE — NUR
REVIEWED PMHX: COPD COLON CA HTN DVT;  CXR 6/4/23; APPROPRIATE FOR HHN PRN THERAPY FOR SOB 
(COVERAGE)

## 2023-06-06 VITALS — SYSTOLIC BLOOD PRESSURE: 99 MMHG | DIASTOLIC BLOOD PRESSURE: 68 MMHG

## 2023-06-06 VITALS — DIASTOLIC BLOOD PRESSURE: 69 MMHG | SYSTOLIC BLOOD PRESSURE: 112 MMHG

## 2023-06-06 VITALS — SYSTOLIC BLOOD PRESSURE: 126 MMHG | DIASTOLIC BLOOD PRESSURE: 77 MMHG

## 2023-06-06 VITALS — DIASTOLIC BLOOD PRESSURE: 71 MMHG | SYSTOLIC BLOOD PRESSURE: 123 MMHG

## 2023-06-06 VITALS — DIASTOLIC BLOOD PRESSURE: 67 MMHG | SYSTOLIC BLOOD PRESSURE: 108 MMHG

## 2023-06-06 VITALS — DIASTOLIC BLOOD PRESSURE: 67 MMHG | SYSTOLIC BLOOD PRESSURE: 119 MMHG

## 2023-06-06 VITALS — DIASTOLIC BLOOD PRESSURE: 68 MMHG | SYSTOLIC BLOOD PRESSURE: 118 MMHG

## 2023-06-06 VITALS — DIASTOLIC BLOOD PRESSURE: 75 MMHG | SYSTOLIC BLOOD PRESSURE: 119 MMHG

## 2023-06-06 VITALS — DIASTOLIC BLOOD PRESSURE: 77 MMHG | SYSTOLIC BLOOD PRESSURE: 121 MMHG

## 2023-06-06 VITALS — SYSTOLIC BLOOD PRESSURE: 118 MMHG | DIASTOLIC BLOOD PRESSURE: 68 MMHG

## 2023-06-06 VITALS — DIASTOLIC BLOOD PRESSURE: 68 MMHG | SYSTOLIC BLOOD PRESSURE: 121 MMHG

## 2023-06-06 VITALS — DIASTOLIC BLOOD PRESSURE: 71 MMHG | SYSTOLIC BLOOD PRESSURE: 115 MMHG

## 2023-06-06 VITALS — SYSTOLIC BLOOD PRESSURE: 115 MMHG | DIASTOLIC BLOOD PRESSURE: 64 MMHG

## 2023-06-06 VITALS — DIASTOLIC BLOOD PRESSURE: 70 MMHG | SYSTOLIC BLOOD PRESSURE: 128 MMHG

## 2023-06-06 VITALS — DIASTOLIC BLOOD PRESSURE: 60 MMHG | SYSTOLIC BLOOD PRESSURE: 103 MMHG

## 2023-06-06 VITALS — DIASTOLIC BLOOD PRESSURE: 73 MMHG | SYSTOLIC BLOOD PRESSURE: 115 MMHG

## 2023-06-06 VITALS — SYSTOLIC BLOOD PRESSURE: 116 MMHG | DIASTOLIC BLOOD PRESSURE: 69 MMHG

## 2023-06-06 VITALS — SYSTOLIC BLOOD PRESSURE: 113 MMHG | DIASTOLIC BLOOD PRESSURE: 72 MMHG

## 2023-06-06 RX ADMIN — SODIUM CHLORIDE, SODIUM LACTATE, POTASSIUM CHLORIDE, AND CALCIUM CHLORIDE SCH MLS/HR: .6; .31; .03; .02 INJECTION, SOLUTION INTRAVENOUS at 03:03

## 2023-06-06 RX ADMIN — DEXTROSE AND SODIUM CHLORIDE SCH MLS/HR: 5; .45 INJECTION, SOLUTION INTRAVENOUS at 21:20

## 2023-06-06 RX ADMIN — PANTOPRAZOLE SODIUM SCH MG: 40 INJECTION, POWDER, FOR SOLUTION INTRAVENOUS at 08:45

## 2023-06-06 RX ADMIN — PANTOPRAZOLE SODIUM SCH MG: 40 INJECTION, POWDER, FOR SOLUTION INTRAVENOUS at 21:00

## 2023-06-06 RX ADMIN — DEXTROSE AND SODIUM CHLORIDE SCH MLS/HR: 5; .45 INJECTION, SOLUTION INTRAVENOUS at 08:45

## 2023-06-06 RX ADMIN — DEXTROSE SCH MLS/HR: 50 INJECTION, SOLUTION INTRAVENOUS at 12:48

## 2023-06-06 RX ADMIN — DEXTROSE SCH MLS/HR: 50 INJECTION, SOLUTION INTRAVENOUS at 05:15

## 2023-06-06 RX ADMIN — DEXTROSE SCH MLS/HR: 50 INJECTION, SOLUTION INTRAVENOUS at 20:05

## 2023-06-06 NOTE — NUR
OBSERVED SIGNS & SYMPTOMS OF PAIN USING FLACC-7. ASSESSED SOURCE OF PAIN, PT VERBALIZED IN 
LOW-TONE "MASAKIT MASYADO DITO" (THE PAIN IS TOO MUCH RIGHT HERE) POINTING AT ABDOMINAL 
AREA. COMFORT MEASURES PROVIDED AND PRN MED OF MORPHINE IV GIVEN SLOWLY. KEPT RESTED.

## 2023-06-06 NOTE — NUR
RECEIVED REPORT FROM [facility] NURSE RIGO. ALL CARES ASSUMED. RECEIVED PT ON BED ON 
SEMI-BARTHOLOMEW'S POSITION, AWAKE, A LITTLE CONFUSED. NOTED ON SINUS TACHYCARDIA ON BEDSIDE 
MONITOR. ON NASAL CANNULA AT 1 L/MIN. WITH NG TUBE OVER RIGHT NARE ON CONTINUOUS SUCTION 
WITH GREENISH OUTPUT NOTED AT 800ML. WITH PERIPHERAL ANTECUBITAL ACCESS OVER RIGHT ARM G 20 
WITH D5.45 NS AT 75 ML/HR - PATENT AND FLOWING WELL. WITH ANOTHER IV LINE OVER LEFT ARM, G22 
- PATENT AND INTACT. WITH CONDOM CATHETER DRAINING TO BAG BY GRAVITY. SAFETY PRECAUTIONS IN 
PLACE AND MAINTAINED.

## 2023-06-06 NOTE — NUR
RECEIVED PATIENT FROM NIGHT SHIFT NURSE DESTINEY. PATIENT ALERT AND ABLE TO FOLLOW SIMPLE 
COMMANDS. PERRL NOTED. NO ADVENTITIOUS LUNG SOUNDS HEARD UPON AUSCULTATION, CURRENTLY ON 2L 
NASAL CANULA AND O2 SATURATION AT 96%. SINUS TACHY ON MONITOR. ABDOMEN IS TENDER UPON TOUCH 
WITH ABDOMINAL QUADRANTS HYPOACTIVE. PATIENT HAS NGT TO SUCTION. PATIENT HAS LEFT 20G IV ON 
FOREARM CURRENTLY INFUSING LACTATED RINGERS  ML/HR. RIGHT 22G IV ON FOREARM. NO SKIN 
BREAKDOWN NOTED. STANDARD PRECAUTION WITH HOB 30 DEGREE FOR ASPIRATION PRECAUTION, BD WHEELS 
LOCKED AND IN LOWEST POSITION.

## 2023-06-06 NOTE — NUR
REPORT GIVEN TO JULIA RIBEIRO. PTS MENTATION HAS IMPROVED TO [facility]. DAUGHTER CAME TO VISIT LAST 
NIGHT FROM THE [facility] AND EXPRESSED WISHES TO SPEAK TO MD. PT REMAINED HEMODYNAMICALLY 
STABLE BUT REMAINED TACHYCARDIC. AFEBRILE. SKIN IS FREE IS FROM SKIN BREAKDOWN. MEPILEX IN 
PLACE.

## 2023-06-07 VITALS — DIASTOLIC BLOOD PRESSURE: 71 MMHG | SYSTOLIC BLOOD PRESSURE: 117 MMHG

## 2023-06-07 VITALS — SYSTOLIC BLOOD PRESSURE: 131 MMHG | DIASTOLIC BLOOD PRESSURE: 84 MMHG

## 2023-06-07 VITALS — SYSTOLIC BLOOD PRESSURE: 119 MMHG | DIASTOLIC BLOOD PRESSURE: 70 MMHG

## 2023-06-07 VITALS — SYSTOLIC BLOOD PRESSURE: 125 MMHG | DIASTOLIC BLOOD PRESSURE: 74 MMHG

## 2023-06-07 VITALS — SYSTOLIC BLOOD PRESSURE: 126 MMHG | DIASTOLIC BLOOD PRESSURE: 75 MMHG

## 2023-06-07 VITALS — DIASTOLIC BLOOD PRESSURE: 69 MMHG | SYSTOLIC BLOOD PRESSURE: 116 MMHG

## 2023-06-07 VITALS — DIASTOLIC BLOOD PRESSURE: 80 MMHG | SYSTOLIC BLOOD PRESSURE: 128 MMHG

## 2023-06-07 VITALS — SYSTOLIC BLOOD PRESSURE: 128 MMHG | DIASTOLIC BLOOD PRESSURE: 80 MMHG

## 2023-06-07 RX ADMIN — DEXTROSE SCH MLS/HR: 50 INJECTION, SOLUTION INTRAVENOUS at 13:00

## 2023-06-07 RX ADMIN — DEXTROSE SCH MLS/HR: 50 INJECTION, SOLUTION INTRAVENOUS at 13:56

## 2023-06-07 RX ADMIN — PANTOPRAZOLE SODIUM SCH MG: 40 INJECTION, POWDER, FOR SOLUTION INTRAVENOUS at 10:37

## 2023-06-07 RX ADMIN — DEXTROSE SCH MLS/HR: 50 INJECTION, SOLUTION INTRAVENOUS at 04:53

## 2023-06-07 NOTE — NUR
pt was picked by transport [facility] to [facility] 

-------------------------------------------------------------------------------

Addendum: 06/07/23 at 1538 by RALEIGH GILES RN

-------------------------------------------------------------------------------

[facility] at [facility] pt v/s 111 HR /75 O2sat 100% on RA 17 RR no /co pain no N/V noted.

## 2023-06-07 NOTE — NUR
Received report from Shagufta RIBEIRO no new change of pt condition. Plant to transfer to SNF 
pending insurance approval.

## 2023-06-07 NOTE — NUR
6/7/23 RD INITIAL ASSESSMENT COMPLETED



PLEASE REFER TO NUTRITION ASSESSMENT UNDER CARE ACTIVITY FOR ESTIMATED NUTRITIONAL NEEDS. 



1. CONTINUE NPO DIET AS TOLERATED AND ADVANCE TO CLEAR LIQUID DIET ONCE MEDICALLY 
APPROPRIATE. 

2. RD WILL CONTINUE TO MONITOR WEIGHT, LABS, GI FUNCTION AND PO INTAKE IF MEDICALLY 
APPROPRIATE.

3. RD TO FOLLOW-UP 3-5 DAYS, MODERATE RISK 



NORIS GRUBER RD